# Patient Record
Sex: MALE | Race: WHITE | Employment: OTHER | URBAN - METROPOLITAN AREA
[De-identification: names, ages, dates, MRNs, and addresses within clinical notes are randomized per-mention and may not be internally consistent; named-entity substitution may affect disease eponyms.]

---

## 2020-04-14 ENCOUNTER — HOSPITAL ENCOUNTER (EMERGENCY)
Age: 75
Discharge: HOME | End: 2020-04-14
Attending: EMERGENCY MEDICINE
Payer: MEDICARE

## 2020-04-14 VITALS
TEMPERATURE: 97.7 F | HEART RATE: 53 BPM | SYSTOLIC BLOOD PRESSURE: 169 MMHG | DIASTOLIC BLOOD PRESSURE: 79 MMHG | RESPIRATION RATE: 18 BRPM | OXYGEN SATURATION: 99 %

## 2020-04-14 PROCEDURE — 99282 EMERGENCY DEPT VISIT SF MDM: CPT

## 2020-04-14 ASSESSMENT — ENCOUNTER SYMPTOMS
EYES NEGATIVE: 1
BLOOD IN STOOL: 0
COUGH: 0
SHORTNESS OF BREATH: 0
ABDOMINAL PAIN: 0

## 2020-04-14 NOTE — ED NOTES
Per MD, wet to dry dressing applied by this RN. Pt tolerated well, no signs of distress.  Pt educated on how to perform this dressing change at home and was given supplied to take home per MD request.     Lefty Baeza RN  04/14/20 5063

## 2020-04-14 NOTE — ED PROVIDER NOTES
frequency. Skin: Positive for wound (RLE). Neurological: Negative. Hematological: Negative. All other systems reviewed and are negative. Past Medical, Surgical, Family, and Social History     He has no past medical history on file. He has no past surgical history on file. His family history is not on file. He reports that he quit smoking about 27 years ago. He has never used smokeless tobacco. He reports previous alcohol use. He reports previous drug use. Medications     Previous Medications    No medications on file       Allergies     He has No Known Allergies. Physical Exam     INITIAL VITALS: BP: (!) 179/76, Temp: 97.7 °F (36.5 °C), Pulse: 53, Resp: 18, SpO2: 99 %   Physical Exam  Vitals signs reviewed. Constitutional:       General: He is not in acute distress. Appearance: Normal appearance. He is normal weight. He is not ill-appearing or toxic-appearing. Comments: Elderly though robust appearing white male sitting up in stretcher. Appears to be in no acute medical or respiratory distress. Pleasant and cooperative with exam.   HENT:      Head: Normocephalic and atraumatic. Right Ear: External ear normal.      Left Ear: External ear normal.      Nose: Nose normal.      Mouth/Throat:      Mouth: Mucous membranes are moist.   Eyes:      General: No scleral icterus. Right eye: No discharge. Left eye: No discharge. Conjunctiva/sclera: Conjunctivae normal.   Neck:      Musculoskeletal: No neck rigidity. Trachea: No tracheal deviation. Pulmonary:      Effort: Pulmonary effort is normal. No respiratory distress. Abdominal:      Tenderness: There is no abdominal tenderness. There is no guarding. Skin:     General: Skin is dry. Neurological:      Mental Status: He is alert.              Diagnostic Results     RECENT VITALS:  BP: (!) 179/76,Temp: 97.7 °F (36.5 °C), Pulse: 53, Resp: 18, SpO2: 99 %     Procedures     - n/a    ED Course     Nursing Wound of right lower extremity, initial encounter    2. Elevated blood pressure reading        Disposition     PATIENT REFERRED TO:  [facility]  [address]  [phone]  On 4/20/2020  Your appointment is at 9:45.  Go to [email or web address] (the 2990 entrance is closed)    Ophelia Phillips MD  [address] [address]  [phone]    Schedule an appointment as soon as possible for a visit   For follow up appointment to reset up a primary care doctor      DISCHARGE MEDICATIONS:  New Prescriptions    No medications on file       Alisha House MD  04/14/20 [address] [place] [address]

## 2020-04-20 ENCOUNTER — HOSPITAL ENCOUNTER (OUTPATIENT)
Dept: WOUND CARE | Age: 75
Discharge: HOME | End: 2020-04-20
Payer: MEDICARE

## 2020-04-20 VITALS
HEART RATE: 69 BPM | DIASTOLIC BLOOD PRESSURE: 79 MMHG | RESPIRATION RATE: 18 BRPM | SYSTOLIC BLOOD PRESSURE: 176 MMHG | TEMPERATURE: 97.6 F

## 2020-04-20 PROBLEM — S81.801A LEG WOUND, RIGHT, INITIAL ENCOUNTER: Status: ACTIVE | Noted: 2020-04-20

## 2020-04-20 PROCEDURE — 99202 OFFICE O/P NEW SF 15 MIN: CPT | Performed by: NURSE PRACTITIONER

## 2020-04-20 PROCEDURE — 11042 DBRDMT SUBQ TIS 1ST 20SQCM/<: CPT | Performed by: NURSE PRACTITIONER

## 2020-04-20 PROCEDURE — 99213 OFFICE O/P EST LOW 20 MIN: CPT

## 2020-04-20 PROCEDURE — 11042 DBRDMT SUBQ TIS 1ST 20SQCM/<: CPT

## 2020-04-20 RX ORDER — LIDOCAINE HYDROCHLORIDE 40 MG/ML
SOLUTION TOPICAL ONCE
Status: DISCONTINUED | OUTPATIENT
Start: 2020-04-20 | End: 2020-04-21 | Stop reason: HOSPADM

## 2020-04-20 NOTE — PROGRESS NOTES
ALLERGIES    No Known Allergies    MEDICATIONS    No current outpatient medications on file prior to encounter. No current facility-administered medications on file prior to encounter. REVIEW OF SYSTEMS    Pertinent items are noted in HPI. Objective:      BP (!) 176/79   Pulse 69   Temp 97.6 °F (36.4 °C) (Temporal)   Resp 18     PHYSICAL EXAM    General Appearance: alert and oriented to person, place and time, well-developed and well-nourished, in no acute distress and pale  Skin: warm and dry  Head: normocephalic and atraumatic  Eyes: pupils equal, round, and reactive to light  Pulmonary/Chest: clear to auscultation bilaterally- no wheezes, rales or rhonchi, normal air movement, no respiratory distress  Cardiovascular: normal rate, normal S1 and S2, no gallops, intact distal pulses and no carotid bruits  Extremities: no cyanosis, no clubbing and no edema      Assessment:     Patient Active Problem List   Diagnosis    CAD (coronary artery disease)    Leg wound, right, initial encounter       Procedure Note    Performed by: ALPESH Riley - CNP    Consent obtained: Yes    Time out taken:  Yes    Pain Control: Anesthetic  Anesthetic: 4% Lidocaine Cream     Debridement:Excisional Debridement    Using curette the wound was sharply debrided    down through and including the removal of epidermis, dermis and subcutaneous tissue.         Devitalized Tissue Debrided:  fibrin, biofilm and slough    Pre Debridement Measurements:  Are located in the Wound Documentation Flow Sheet    Wound #: 1     Post  Debridement Measurements:  Wound 04/20/20 Leg Medial;Right #1 (Active)   Wound Image   4/20/2020  9:43 AM   Wound Traumatic 4/20/2020  9:43 AM   Wound Cleansed Rinsed/Irrigated with saline 4/20/2020  9:43 AM   Wound Length (cm) 4.9 cm 4/20/2020  9:43 AM   Wound Width (cm) 2.2 cm 4/20/2020  9:43 AM   Wound Depth (cm) 0.1 cm 4/20/2020  9:43 AM   Wound Surface Area (cm^2) 10.78 cm^2 4/20/2020  9:43 AM Wound Volume (cm^3) 1.08 cm^3 4/20/2020  9:43 AM   Post-Procedure Length (cm) 5 cm 4/20/2020 10:12 AM   Post-Procedure Width (cm) 2.3 cm 4/20/2020 10:12 AM   Post-Procedure Depth (cm) 0.1 cm 4/20/2020 10:12 AM   Post-Procedure Surface Area (cm^2) 11.5 cm^2 4/20/2020 10:12 AM   Post-Procedure Volume (cm^3) 1.15 cm^3 4/20/2020 10:12 AM   Wound Assessment Bleeding 4/20/2020 10:12 AM   Drainage Amount Moderate 4/20/2020 10:12 AM   Drainage Description Serosanguinous 4/20/2020 10:12 AM   Anaya-wound Assessment Dry;Clean 4/20/2020  9:43 AM   [place]%Wound Bed 0 4/20/2020  9:43 AM   Red%Wound Bed 0 4/20/2020  9:43 AM   Yellow%Wound Bed 90 4/20/2020  9:43 AM   Black%Wound Bed 10 4/20/2020  9:43 AM   Purple%Wound Bed 0 4/20/2020  9:43 AM   Other%Wound Bed 0 4/20/2020  9:43 AM   Number of days: 0           Total Surface Area Debrided:  11.5 sq cm     Percentage of wound debrided 100%    Bleeding:  Minimal    Hemostasis Achieved:  not needed    Procedural Pain:  1  / 10     Post Procedural Pain:  0 / 10     Response to treatment:  Well tolerated by patient. Plan:     The nature of the patient's condition was explained in depth. The patient was informed that their compliance to the treatment plan is paramount to successful healing and prevention of further ulceration and/or infection     Discharge Treatment   Dressing care: Santyl, moist to dry dressing, 1 tubi  - Change daily. You have been given a prescription for Santyl. Santyl is an enzyme that dissolves dead tissue in the wound. Apply a small amount to the wound and cover with a slightly moist gauze. If you can't get Santyl call the wound center and we will order [address].       Written Patient Discharge Instructions Given            Electronically signed by ALPESH Glez CNP on 4/20/2020 at 2:05 PM

## 2020-04-27 ENCOUNTER — HOSPITAL ENCOUNTER (OUTPATIENT)
Dept: WOUND CARE | Age: 75
Discharge: HOME | End: 2020-04-27
Payer: MEDICARE

## 2020-04-27 VITALS
DIASTOLIC BLOOD PRESSURE: 67 MMHG | SYSTOLIC BLOOD PRESSURE: 159 MMHG | TEMPERATURE: 97.2 F | HEART RATE: 65 BPM | RESPIRATION RATE: 16 BRPM

## 2020-04-27 PROBLEM — S81.802D LEG WOUND, LEFT, SUBSEQUENT ENCOUNTER: Status: ACTIVE | Noted: 2020-04-27

## 2020-04-27 PROCEDURE — 11042 DBRDMT SUBQ TIS 1ST 20SQCM/<: CPT | Performed by: NURSE PRACTITIONER

## 2020-04-27 PROCEDURE — 11042 DBRDMT SUBQ TIS 1ST 20SQCM/<: CPT

## 2020-04-27 RX ORDER — LIDOCAINE HYDROCHLORIDE 40 MG/ML
SOLUTION TOPICAL ONCE
Status: DISCONTINUED | OUTPATIENT
Start: 2020-04-27 | End: 2020-04-28 | Stop reason: HOSPADM

## 2020-04-27 NOTE — PROGRESS NOTES
Discharge Treatment   Dressing care: Santyl, moist to dry dressing, 1 tubi  - Change daily. You have been given a prescription for Santyl. Santyl is an enzyme that dissolves dead tissue in the wound. Apply a small amount to the wound and cover with a slightly moist gauze.       Written Patient Discharge Instructions Given            Electronically signed by ALPESH Ibarra CNP on 4/27/2020 at 2:00 PM

## 2020-05-11 ENCOUNTER — HOSPITAL ENCOUNTER (OUTPATIENT)
Dept: WOUND CARE | Age: 75
Discharge: HOME | End: 2020-05-11
Payer: MEDICARE

## 2020-05-11 VITALS — RESPIRATION RATE: 16 BRPM | SYSTOLIC BLOOD PRESSURE: 155 MMHG | DIASTOLIC BLOOD PRESSURE: 76 MMHG | HEART RATE: 68 BPM

## 2020-05-11 PROCEDURE — 11042 DBRDMT SUBQ TIS 1ST 20SQCM/<: CPT

## 2020-05-11 PROCEDURE — 11042 DBRDMT SUBQ TIS 1ST 20SQCM/<: CPT | Performed by: NURSE PRACTITIONER

## 2020-05-11 NOTE — PROGRESS NOTES
medications on file prior to encounter. REVIEW OF SYSTEMS    Pertinent items are noted in HPI. Objective:      BP (!) 155/76   Pulse 68   Resp 16     PHYSICAL EXAM    General Appearance: alert and oriented to person, place and time, well-developed and well-nourished, in no acute distress  Skin: warm and dry, no rash or erythema  Head: normocephalic and atraumatic  Eyes: pupils equal, round, and reactive to light  Pulmonary/Chest:  normal air movement, no respiratory distress  Cardiovascular: normal rate, regular rhythm and intact distal pulses  Extremities: no cyanosis, no clubbing, no edema and venous stasis dermatosis noted      Assessment:     Patient Active Problem List   Diagnosis    CAD (coronary artery disease)    Leg wound, right, initial encounter    Leg wound, left, subsequent encounter       Procedure Note    Performed by: ALPESH Cervantes CNP    Consent obtained: Yes    Time out taken:  Yes    Pain Control: Anesthetic  Anesthetic: 4% Lidocaine Cream     Debridement:Excisional Debridement    Using curette the wound was sharply debrided    down through and including the removal of epidermis, dermis and subcutaneous tissue.         Devitalized Tissue Debrided:  fibrin, biofilm and slough    Pre Debridement Measurements:  Are located in the Wound Documentation Flow Sheet    Wound #: 1     Post  Debridement Measurements:  Wound 04/20/20 Leg Medial;Right #1 (Active)   Wound Image   4/20/2020  9:43 AM   Wound Traumatic 5/11/2020 10:09 AM   Wound Cleansed Rinsed/Irrigated with saline 5/11/2020 10:09 AM   Wound Length (cm) 3.6 cm 5/11/2020 10:09 AM   Wound Width (cm) 1.6 cm 5/11/2020 10:09 AM   Wound Depth (cm) 0.1 cm 5/11/2020 10:09 AM   Wound Surface Area (cm^2) 5.76 cm^2 5/11/2020 10:09 AM   Change in Wound Size % (l*w) 46.57 5/11/2020 10:09 AM   Wound Volume (cm^3) 0.58 cm^3 5/11/2020 10:09 AM   Wound Healing % 46 5/11/2020 10:09 AM   Post-Procedure Length (cm) 3.7 cm 5/11/2020 10:16 AM Post-Procedure Width (cm) 1.7 cm 5/11/2020 10:16 AM   Post-Procedure Depth (cm) 0.1 cm 5/11/2020 10:16 AM   Post-Procedure Surface Area (cm^2) 6.29 cm^2 5/11/2020 10:16 AM   Post-Procedure Volume (cm^3) 0.63 cm^3 5/11/2020 10:16 AM   Wound Assessment Bleeding 5/11/2020 10:16 AM   Drainage Amount Moderate 5/11/2020 10:16 AM   Drainage Description Serosanguinous 5/11/2020 10:16 AM   Odor None 5/11/2020 10:09 AM   Anaya-wound Assessment Dry;Clean 5/11/2020 10:09 AM   [place]%Wound Bed 70 5/11/2020 10:09 AM   Red%Wound Bed 0 5/11/2020 10:09 AM   Yellow%Wound Bed 30 5/11/2020 10:09 AM   Black%Wound Bed 0 5/11/2020 10:09 AM   Purple%Wound Bed 0 5/11/2020 10:09 AM   Other%Wound Bed 0 5/11/2020 10:09 AM   Number of days: 21           Total Surface Area Debrided:  6.29 sq cm     Percentage of wound debrided 100%    Bleeding:  Minimal    Hemostasis Achieved:  not needed    Procedural Pain:  2  / 10     Post Procedural Pain:  1 / 10     Response to treatment:  Well tolerated by patient. Plan:     The nature of the patient's condition was explained in depth. The patient was informed that their compliance to the treatment plan is paramount to successful healing and prevention of further ulceration and/or infection     Discharge Treatment   Dressing care: Santyl, moist to dry dressing, 1 tubi  - Change daily. You have been given a prescription for Santyl. Santyl is an enzyme that dissolves dead tissue in the wound. Apply a small amount to the wound and cover with a slightly moist gauze.       Written Patient Discharge Instructions Given            Electronically signed by ALPESH Steele CNP on 5/11/2020 at 10:44 AM

## 2020-05-28 ENCOUNTER — HOSPITAL ENCOUNTER (OUTPATIENT)
Dept: WOUND CARE | Age: 75
Discharge: HOME | End: 2020-05-28
Payer: MEDICARE

## 2020-05-28 VITALS — RESPIRATION RATE: 16 BRPM | SYSTOLIC BLOOD PRESSURE: 172 MMHG | HEART RATE: 64 BPM | DIASTOLIC BLOOD PRESSURE: 76 MMHG

## 2020-05-28 PROCEDURE — 11042 DBRDMT SUBQ TIS 1ST 20SQCM/<: CPT

## 2020-05-28 PROCEDURE — 11042 DBRDMT SUBQ TIS 1ST 20SQCM/<: CPT | Performed by: NURSE PRACTITIONER

## 2020-06-11 ENCOUNTER — HOSPITAL ENCOUNTER (OUTPATIENT)
Dept: WOUND CARE | Age: 75
Discharge: HOME | End: 2020-06-11
Payer: MEDICARE

## 2020-06-11 VITALS
SYSTOLIC BLOOD PRESSURE: 151 MMHG | DIASTOLIC BLOOD PRESSURE: 77 MMHG | TEMPERATURE: 97.8 F | RESPIRATION RATE: 16 BRPM | HEART RATE: 69 BPM

## 2020-06-11 PROBLEM — S81.801D LEG WOUND, RIGHT, SUBSEQUENT ENCOUNTER: Status: ACTIVE | Noted: 2020-04-27

## 2020-06-11 PROCEDURE — 11042 DBRDMT SUBQ TIS 1ST 20SQCM/<: CPT | Performed by: NURSE PRACTITIONER

## 2020-06-11 PROCEDURE — 11042 DBRDMT SUBQ TIS 1ST 20SQCM/<: CPT

## 2020-06-11 RX ORDER — LIDOCAINE HYDROCHLORIDE 40 MG/ML
SOLUTION TOPICAL ONCE
Status: DISCONTINUED | OUTPATIENT
Start: 2020-06-11 | End: 2020-06-12 | Stop reason: HOSPADM

## 2020-06-11 NOTE — PROGRESS NOTES
1:37 PM   Post-Procedure Depth (cm) 0.1 cm 6/11/2020  1:37 PM   Post-Procedure Surface Area (cm^2) 1.44 cm^2 6/11/2020  1:37 PM   Post-Procedure Volume (cm^3) 0.14 cm^3 6/11/2020  1:37 PM   Wound Assessment Bleeding 6/11/2020  1:37 PM   Drainage Amount Moderate 6/11/2020  1:37 PM   Drainage Description Serosanguinous 6/11/2020  1:37 PM   Odor None 6/11/2020  1:25 PM   Anaya-wound Assessment [place] 6/11/2020  1:25 PM   [place]%Wound Bed 100 6/11/2020  1:25 PM   Red%Wound Bed 0 6/11/2020  1:25 PM   Yellow%Wound Bed 0 6/11/2020  1:25 PM   Black%Wound Bed 0 6/11/2020  1:25 PM   Purple%Wound Bed 0 6/11/2020  1:25 PM   Other%Wound Bed 0 6/11/2020  1:25 PM   Number of days: 52           Total Surface Area Debrided:  1.44 sq cm     Percentage of wound debrided 100%    Bleeding:  Minimal    Hemostasis Achieved:  not needed    Procedural Pain:  0  / 10     Post Procedural Pain:  0 / 10     Response to treatment:  Well tolerated by patient. Plan:     The nature of the patient's condition was explained in depth. The patient was informed that their compliance to the treatment plan is paramount to successful healing and prevention of further ulceration and/or infection     Discharge Treatment  Pt education per provider related to progress I wound healing and continuation of same plan of care. Pt agreeable with plan of care with hydrofera blue every changed every other day and questions answered.      Written Patient Discharge Instructions Given            Electronically signed by ALPESH Alvarez CNP on 6/11/2020 at 1:44 PM

## 2020-06-25 ENCOUNTER — HOSPITAL ENCOUNTER (OUTPATIENT)
Dept: WOUND CARE | Age: 75
Discharge: HOME | End: 2020-06-25
Payer: MEDICARE

## 2020-06-25 VITALS — RESPIRATION RATE: 16 BRPM | HEART RATE: 64 BPM | DIASTOLIC BLOOD PRESSURE: 71 MMHG | SYSTOLIC BLOOD PRESSURE: 153 MMHG

## 2020-06-25 PROCEDURE — 99212 OFFICE O/P EST SF 10 MIN: CPT

## 2020-06-25 PROCEDURE — 99212 OFFICE O/P EST SF 10 MIN: CPT | Performed by: NURSE PRACTITIONER

## 2020-06-25 RX ORDER — CLOBETASOL PROPIONATE 0.5 MG/G
OINTMENT TOPICAL ONCE
Status: CANCELLED | OUTPATIENT
Start: 2020-06-25

## 2020-06-25 RX ORDER — LIDOCAINE 50 MG/G
OINTMENT TOPICAL ONCE
Status: CANCELLED | OUTPATIENT
Start: 2020-06-25

## 2020-06-25 RX ORDER — GINSENG 100 MG
CAPSULE ORAL ONCE
Status: CANCELLED | OUTPATIENT
Start: 2020-06-25

## 2020-06-25 RX ORDER — BETAMETHASONE DIPROPIONATE 0.05 %
OINTMENT (GRAM) TOPICAL ONCE
Status: CANCELLED | OUTPATIENT
Start: 2020-06-25

## 2020-06-25 RX ORDER — LIDOCAINE 40 MG/G
CREAM TOPICAL ONCE
Status: CANCELLED | OUTPATIENT
Start: 2020-06-25

## 2020-06-25 RX ORDER — BACITRACIN ZINC AND POLYMYXIN B SULFATE 500; 1000 [USP'U]/G; [USP'U]/G
OINTMENT TOPICAL ONCE
Status: CANCELLED | OUTPATIENT
Start: 2020-06-25

## 2020-06-25 RX ORDER — LIDOCAINE HYDROCHLORIDE 40 MG/ML
SOLUTION TOPICAL ONCE
Status: CANCELLED | OUTPATIENT
Start: 2020-06-25

## 2020-06-25 RX ORDER — GENTAMICIN SULFATE 1 MG/G
OINTMENT TOPICAL ONCE
Status: CANCELLED | OUTPATIENT
Start: 2020-06-25

## 2020-06-25 RX ORDER — LIDOCAINE HYDROCHLORIDE 40 MG/ML
SOLUTION TOPICAL ONCE
Status: DISCONTINUED | OUTPATIENT
Start: 2020-06-25 | End: 2020-06-25

## 2020-06-25 RX ORDER — BACITRACIN, NEOMYCIN, POLYMYXIN B 400; 3.5; 5 [USP'U]/G; MG/G; [USP'U]/G
OINTMENT TOPICAL ONCE
Status: CANCELLED | OUTPATIENT
Start: 2020-06-25

## 2020-06-25 NOTE — PROGRESS NOTES
oriented to person, place and time, well-developed and well-nourished, in no acute distress  Skin: warm and dry, no rash or erythema  Head: normocephalic and atraumatic  Eyes: pupils equal, round, and reactive to light  Pulmonary/Chest:  normal air movement, no respiratory distress  Cardiovascular: normal rate, regular rhythm and intact distal pulses  Extremities: no cyanosis, no clubbing and venous stasis dermatosis noted      Assessment:     Patient Active Problem List   Diagnosis    CAD (coronary artery disease)    Leg wound, right, initial encounter    Leg wound, right, subsequent encounter       Procedure Note - No procedure    Wound 04/20/20 Leg Right; Lower;Medial #1 (Active)   Wound Image   4/20/2020  9:43 AM   Wound Traumatic 6/25/2020  1:07 PM   Wound Cleansed Rinsed/Irrigated with saline 6/25/2020  1:07 PM   Wound Length (cm) 0 cm 6/25/2020  1:07 PM   Wound Width (cm) 0 cm 6/25/2020  1:07 PM   Wound Depth (cm) 0 cm 6/25/2020  1:07 PM   Wound Surface Area (cm^2) 0 cm^2 6/25/2020  1:07 PM   Change in Wound Size % (l*w) 100 6/25/2020  1:07 PM   Wound Volume (cm^3) 0 cm^3 6/25/2020  1:07 PM   Wound Healing % 100 6/25/2020  1:07 PM   Post-Procedure Length (cm) 1.6 cm 6/11/2020  1:37 PM   Post-Procedure Width (cm) 0.9 cm 6/11/2020  1:37 PM   Post-Procedure Depth (cm) 0.1 cm 6/11/2020  1:37 PM   Post-Procedure Surface Area (cm^2) 1.44 cm^2 6/11/2020  1:37 PM   Post-Procedure Volume (cm^3) 0.14 cm^3 6/11/2020  1:37 PM   Wound Assessment Dry 6/25/2020  1:07 PM   Drainage Amount None 6/25/2020  1:07 PM   Drainage Description Serosanguinous 6/11/2020  1:37 PM   Odor None 6/11/2020  1:25 PM   Anaya-wound Assessment Dry;Clean 6/25/2020  1:07 PM   [place]%Wound Bed 100 6/11/2020  1:25 PM   Red%Wound Bed 0 6/11/2020  1:25 PM   Yellow%Wound Bed 0 6/11/2020  1:25 PM   Black%Wound Bed 0 6/11/2020  1:25 PM   Purple%Wound Bed 0 6/11/2020  1:25 PM   Other%Wound Bed 0 6/11/2020  1:25 PM   Number of days: 66         Plan:      The nature of the patient's condition was explained in depth. The patient was informed that their compliance to the treatment plan is paramount to successful healing and prevention of further ulceration and/or infection     Discharge Treatment   Cover closed wound with silicone dressing and keep on until Monday. Discussed keeping lotion to legs to add moisture. Pt agreeable to plan of care and questions answered.    Written Patient Discharge Instructions Given            Electronically signed by ALPESH Quan CNP on 6/25/2020 at 1:22 PM

## 2021-07-21 ENCOUNTER — APPOINTMENT (OUTPATIENT)
Dept: GENERAL RADIOLOGY | Age: 76
End: 2021-07-21
Payer: MEDICARE

## 2021-07-21 ENCOUNTER — HOSPITAL ENCOUNTER (EMERGENCY)
Age: 76
Discharge: HOME | End: 2021-07-22
Attending: EMERGENCY MEDICINE
Payer: MEDICARE

## 2021-07-21 VITALS
DIASTOLIC BLOOD PRESSURE: 72 MMHG | RESPIRATION RATE: 18 BRPM | HEART RATE: 78 BPM | TEMPERATURE: 98.2 F | OXYGEN SATURATION: 100 % | SYSTOLIC BLOOD PRESSURE: 167 MMHG
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
## 2021-07-21 PROCEDURE — 6360000002 HC RX W HCPCS: Performed by: NURSE PRACTITIONER

## 2021-07-21 PROCEDURE — 90715 TDAP VACCINE 7 YRS/> IM: CPT | Performed by: NURSE PRACTITIONER

## 2021-07-21 PROCEDURE — 90471 IMMUNIZATION ADMIN: CPT | Performed by: NURSE PRACTITIONER

## 2021-07-21 PROCEDURE — 2500000003 HC RX 250 WO HCPCS: Performed by: NURSE PRACTITIONER

## 2021-07-21 PROCEDURE — 73590 X-RAY EXAM OF LOWER LEG: CPT

## 2021-07-21 PROCEDURE — 2580000003 HC RX 258: Performed by: NURSE PRACTITIONER

## 2021-07-21 PROCEDURE — 96366 THER/PROPH/DIAG IV INF ADDON: CPT

## 2021-07-21 PROCEDURE — 12006 RPR S/N/A/GEN/TRK20.1-30.0CM: CPT

## 2021-07-21 PROCEDURE — 99283 EMERGENCY DEPT VISIT LOW MDM: CPT

## 2021-07-21 PROCEDURE — 96365 THER/PROPH/DIAG IV INF INIT: CPT

## 2021-07-21 RX ADMIN — TETANUS TOXOID, REDUCED DIPHTHERIA TOXOID AND ACELLULAR PERTUSSIS VACCINE, ADSORBED 0.5 ML: 5; 2.5; 8; 8; 2.5 SUSPENSION INTRAMUSCULAR at 22:48

## 2021-07-21 RX ADMIN — LIDOCAINE HYDROCHLORIDE 10 ML: 10; .005 INJECTION, SOLUTION EPIDURAL; INFILTRATION; INTRACAUDAL; PERINEURAL at 22:56

## 2021-07-21 RX ADMIN — SODIUM CHLORIDE 1500 MG: 900 INJECTION INTRAVENOUS at 22:55

## 2021-07-21 ASSESSMENT — PAIN SCALES - GENERAL: PAINLEVEL_OUTOF10: 0

## 2021-07-22 PROCEDURE — 96366 THER/PROPH/DIAG IV INF ADDON: CPT

## 2021-07-22 PROCEDURE — 6370000000 HC RX 637 (ALT 250 FOR IP): Performed by: NURSE PRACTITIONER

## 2021-07-22 RX ORDER — BACITRACIN ZINC AND POLYMYXIN B SULFATE 500; 1000 [USP'U]/G; [USP'U]/G
OINTMENT TOPICAL ONCE
Status: COMPLETED | OUTPATIENT
Start: 2021-07-22 | End: 2021-07-22

## 2021-07-22 RX ORDER — AMOXICILLIN AND CLAVULANATE POTASSIUM 875; 125 MG/1; MG/1
1 TABLET, FILM COATED ORAL 2 TIMES DAILY
Qty: 14 TABLET | Refills: 0 | Status: SHIPPED | OUTPATIENT
Start: 2021-07-22 | End: 2021-07-29

## 2021-07-22 RX ORDER — HYDROCODONE BITARTRATE AND ACETAMINOPHEN 5; 325 MG/1; MG/1
1 TABLET ORAL EVERY 6 HOURS PRN
Qty: 8 TABLET | Refills: 0 | Status: SHIPPED | OUTPATIENT
Start: 2021-07-22 | End: 2021-07-25

## 2021-07-22 RX ADMIN — BACITRACIN ZINC AND POLYMYXIN B SULFATE: 500; 10000 OINTMENT TOPICAL at 00:59

## 2021-07-22 NOTE — ED NOTES
Discharge instructions and prescriptions reviewed with patient. Pt verbalized understanding. IV d/c. Pt tolerated well. Pt discharged home by self.        Jaron Guevara RN  07/22/21 4501

## 2021-07-22 NOTE — ED PROVIDER NOTES
1 [facility]  EMERGENCY DEPARTMENT ENCOUNTER          NURSE PRACTITIONER NOTE     Date of evaluation: 7/21/2021    Chief Complaint     Animal Bite      History of Present Illness     Nubia Matos is a 77 y.o. male with no significant past medical history presents to the emergency department with a dog bite to the right lower leg. Patient states that this dog has chased him multiple times in the past.  Today he was riding his bike by the dog was loose and came up and bit him on his right lower extremity. Significant soft tissue defect. States he went home and came straight here. Is unclear of the date of his last tetanus immunization. He knows the house where the dog lives, but does not know the vaccination status. The police are not yet involved in this case. He denies any numbness or weakness to the foot. He denies additional injuries associated with the incident. With the exception of the above, there are no aggravating or alleviating factors. Review of Systems     Pertinent positive and negative findings as documented above in HPI, otherwise all other systems were reviewed and negative     Past Medical, Surgical, Family, and Social History     Medical History:   Past Medical History:   Diagnosis Date    Leg wound, left, subsequent encounter 4/27/2020    traumatic    Leg wound, right, subsequent encounter 4/27/2020    traumatic       Surgical History: History reviewed. No pertinent surgical history. Social History: He reports that he quit smoking about 28 years ago. He has never used smokeless tobacco. He reports previous alcohol use. He reports previous drug use. Family History: His family history is not on file. Medications     Previous Medications    No medications on file       Allergies     Allergies:    Allergies as of 07/21/2021    (No Known Allergies)        Physical Exam     INITIAL VITALS: BP: (!) 167/72, Temp: 98.2 °F (36.8 °C), Pulse: 78, Resp: 18, SpO2: 100 % edges approximated. The patient tolerated the procedure well. Complications: None          ED Course     Nursing Notes, Past Medical Hx, Past Surgical Hx, Social Hx, Allergies, and Family Hx were reviewed. The patient was given the following medications:  Orders Placed This Encounter   Medications    ampicillin-sulbactam (UNASYN) 1500 mg IVPB minibag     Order Specific Question:   Antimicrobial Indications     Answer: Other     Order Specific Question:   Antimicrobial Indications     Answer:   Skin and Soft Tissue Infection     Order Specific Question:   Other Abx Indication     Answer:   dog bite    Tetanus-Diphth-Acell Pertussis (BOOSTRIX) injection 0.5 mL    lidocaine-EPINEPHrine 1 percent-1:[id number] injection 10 mL    amoxicillin-clavulanate (AUGMENTIN) 875-125 MG per tablet     Sig: Take 1 tablet by mouth 2 times daily for 7 days     Dispense:  14 tablet     Refill:  0    HYDROcodone-acetaminophen (NORCO) 5-325 MG per tablet     Sig: Take 1 tablet by mouth every 6 hours as needed for Pain for up to 3 days. Dispense:  8 tablet     Refill:  0            CONSULTS:  None    MEDICAL DECISION MAKING / ASSESSMENT / PLAN     Briefly, Paige Betancourt is a 77 y.o. male who presented to the emergency department with dog bite to his right lower extremity. On presentation, is well-appearing, no acute distress. Is afebrile with stable vital signs. On exam, significant soft tissue defect to the lateral aspect of the right lower leg. An x-ray was obtained which showed no evidence of foreign body or underlying osseous abnormality. He was written for Unasyn and his tetanus was updated. The area was anesthetized, irrigated copiously, and loosely closed as indicated above in the procedure note. He will be started on Augmentin for antibiotic prophylaxis. He was instructed to follow-up with lawn [employer] cement/animal control to verify the rabies status of the dog.   He states he plans to go to the police department after he is discharged. He is aware that if the animal is not vaccinated and cannot be quarantined for 14 days that he will need to return to the emergency department for rabies immunoglobulin/immunization. He was instructed to monitor for signs symptoms of local wound infection. He will be given instructions to follow-up with the [facility] outpatient clinic for suture removal in approximately 10 days. At this point in time, patient is stable for discharge. Patient was given strict return precautions as outlined in the AVS. Patient was agreeable and understanding to this plan of care. Prior to discharge, patient was ambulatory and PO tolerant. The patient was evaluated by myself and the ED Attending Physician, Dr. Dean Lacks All management and disposition plans were discussed and agreed upon. Clinical Impression     1. Dog bite, initial encounter        Disposition     DC    DISCHARGE MEDICATIONS:  New Prescriptions    AMOXICILLIN-CLAVULANATE (AUGMENTIN) 875-125 MG PER TABLET    Take 1 tablet by mouth 2 times daily for 7 days    HYDROCODONE-ACETAMINOPHEN (NORCO) 5-325 MG PER TABLET    Take 1 tablet by mouth every 6 hours as needed for Pain for up to 3 days.        PATIENT REFERRED TO:  [facility] Emergency Department  [address] [address] [address]  [address]  [phone]    As needed, If symptoms worsen    Sherman Son MD  [address]          [address]  [facility] [address] [address] ALPESH Miramontes - CNP, CNP  Department of Emergency Medicine     [address] [facility] [address] [place] [address]  07/22/21 0021

## 2021-07-22 NOTE — ED PROVIDER NOTES
ED Attending Attestation Note     Date of evaluation: 7/21/2021    This patient was seen by the advance practice provider. I have seen and examined the patient, agree with the workup, evaluation, management and diagnosis. The care plan has been discussed. My assessment reveals complaints of a dog bite to the right leg. Patient was bitten by a neighbor's dog. Patient has a large skin defect present to the lateral calf and is neurovascularly intact distally. I was present for the laceration repair.       Lazarus Snuffer, MD  07/21/21 5472

## 2021-08-01 ENCOUNTER — HOSPITAL ENCOUNTER (EMERGENCY)
Age: 76
Discharge: HOME | End: 2021-08-01
Attending: EMERGENCY MEDICINE
Payer: MEDICARE

## 2021-08-01 VITALS
SYSTOLIC BLOOD PRESSURE: 167 MMHG | WEIGHT: 130 LBS | OXYGEN SATURATION: 100 % | BODY MASS INDEX: 19.26 KG/M2 | TEMPERATURE: 99 F | HEART RATE: 83 BPM | DIASTOLIC BLOOD PRESSURE: 83 MMHG | RESPIRATION RATE: 16 BRPM | HEIGHT: 69 IN

## 2021-08-01 DIAGNOSIS — S81.801D LEG WOUND, RIGHT, SUBSEQUENT ENCOUNTER: Primary | ICD-10-CM

## 2021-08-01 PROCEDURE — 99283 EMERGENCY DEPT VISIT LOW MDM: CPT

## 2021-08-01 PROCEDURE — 6370000000 HC RX 637 (ALT 250 FOR IP): Performed by: STUDENT IN AN ORGANIZED HEALTH CARE EDUCATION/TRAINING PROGRAM

## 2021-08-01 RX ORDER — BACITRACIN ZINC AND POLYMYXIN B SULFATE 500; 1000 [USP'U]/G; [USP'U]/G
OINTMENT TOPICAL ONCE
Status: COMPLETED | OUTPATIENT
Start: 2021-08-01 | End: 2021-08-01

## 2021-08-01 RX ADMIN — BACITRACIN ZINC AND POLYMYXIN B SULFATE: 500; 10000 OINTMENT TOPICAL at 22:21

## 2021-08-02 NOTE — ED PROVIDER NOTES
ED Attending Attestation Note     Date of evaluation: 8/1/2021    This patient was seen by the resident. I have seen and examined the patient, agree with the workup, evaluation, management and diagnosis. The care plan has been discussed. My assessment reveals a slender, elderly gentleman, in no acute distress. He presents, as instructed, 10 days after suture placement for a severe dog bite to the lateral aspect of the right lower leg. On examination of the wound, there appears to have been a great deal of tension on the wound, as there are multiple mattress sutures in place. There does seem to still be a good bit of tension on the tissues, and there are some areas where the wound edges are and not yet healing. Given his frail skin, the tension on the wound, and incomplete healing, it is felt to be prudent to allow the sutures to remain in place for closer to 14 days. He is given a referral to the wound care clinic, and hopefully he can be seen in the next few days to have sutures removed there, and began following up on a routine basis, as he has had to do for other wounds.   There is no evidence of infection, however, on exam.       Brianda Parker MD  08/01/21 3303

## 2021-08-02 NOTE — ED PROVIDER NOTES
Cardiovascular:      Rate and Rhythm: Normal rate and regular rhythm. Pulses: Normal pulses. Pulmonary:      Effort: Pulmonary effort is normal.   Abdominal:      General: Abdomen is flat. Palpations: Abdomen is soft. Musculoskeletal:         General: Deformity present. No swelling or tenderness. Normal range of motion. Right lower leg: No edema. Left lower leg: No edema. Skin:     General: Skin is warm and dry. Neurological:      General: No focal deficit present. Mental Status: He is alert and oriented to person, place, and time. Psychiatric:         Mood and Affect: Mood normal.         Behavior: Behavior normal.         Thought Content: Thought content normal.         Judgment: Judgment normal.         Diagnostic Results       RADIOLOGY:  No orders to display       LABS:   No results found for this visit on 08/01/21. RECENT VITALS:  BP: (!) 167/83, Temp: 99 °F (37.2 °C),Pulse: 83, Resp: 16, SpO2: 100 %       ED Course     Nursing Notes, Past Medical Hx, Past Surgical Hx, Social Hx, Allergies, and FamilyHx were reviewed. The patient was giventhe following medications:  Orders Placed This Encounter   Medications    bacitracin-polymyxin b (POLYSPORIN) ointment       CONSULTS:  None    MEDICAL DECISION MAKING / ASSESSMENT / Katie Nohelia is a 77 y.o. male who presented to the ED for suture removal after sustaining a dog bite on July 21. On examination of his wound, there appears to be tension of the wound tissue and parts are not yet healed adequately. This patient would benefit from closer to 14 days prior to suture removal.  His wound was irrigated, cleaned, and dressed. He was referred to see wound care for suture removal and follow-up of his wounds. Additionally, I discussed the importance of verifying the rabies status of the dog who bit him. This was already discussed with him at length at his prior ED visit.   He insists on going to his police station this evening to follow-up with their investigation with the Department of Health regarding any progress made in determination of rabies status of this dog. I reiterated that this issue is time sensitive and the patient should return as soon as possible if he does not obtain any information or if the dog has not been quarantined at this point. This patient was also evaluated by the attending physician. All care plans were discussed and agreed upon. Clinical Impression     1.  Leg wound, right, subsequent encounter        Disposition     PATIENT REFERRED TO:  Nicole Morgan MD  [address] [place] [address]    In 1 week      [address] [place] [address]  [address] [place] [address] [address]  Schedule an appointment as soon as possible for a visit on 8/4/2021  For wound re-check, For suture removal      DISCHARGE MEDICATIONS:  New Prescriptions    No medications on file       DISPOSITION Decision To Discharge 08/01/2021 10:23:43 PM     Stacey Queen MD  Resident  08/01/21 [id number]

## 2021-08-05 ENCOUNTER — HOSPITAL ENCOUNTER (OUTPATIENT)
Dept: WOUND CARE | Age: 76
Discharge: HOME | End: 2021-08-05
Payer: MEDICARE

## 2021-08-05 VITALS
BODY MASS INDEX: 18.61 KG/M2 | HEART RATE: 69 BPM | WEIGHT: 125.66 LBS | RESPIRATION RATE: 16 BRPM | HEIGHT: 69 IN | DIASTOLIC BLOOD PRESSURE: 89 MMHG | TEMPERATURE: 97.5 F | SYSTOLIC BLOOD PRESSURE: 166 MMHG

## 2021-08-05 DIAGNOSIS — T81.89XA NON-HEALING SURGICAL WOUND, INITIAL ENCOUNTER: ICD-10-CM
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
## 2021-08-05 DIAGNOSIS — S81.801A LEG WOUND, RIGHT, INITIAL ENCOUNTER: ICD-10-CM

## 2021-08-05 DIAGNOSIS — S81.801D LEG WOUND, RIGHT, SUBSEQUENT ENCOUNTER: Primary | ICD-10-CM

## 2021-08-05 PROCEDURE — 97597 DBRDMT OPN WND 1ST 20 CM/<: CPT | Performed by: SPECIALIST

## 2021-08-05 PROCEDURE — 11042 DBRDMT SUBQ TIS 1ST 20SQCM/<: CPT

## 2021-08-05 PROCEDURE — 6370000000 HC RX 637 (ALT 250 FOR IP): Performed by: SPECIALIST

## 2021-08-05 PROCEDURE — 99213 OFFICE O/P EST LOW 20 MIN: CPT

## 2021-08-05 PROCEDURE — 99202 OFFICE O/P NEW SF 15 MIN: CPT | Performed by: SPECIALIST

## 2021-08-05 PROCEDURE — 29581 APPL MULTLAYER CMPRN SYS LEG: CPT

## 2021-08-05 RX ORDER — BETAMETHASONE DIPROPIONATE 0.05 %
OINTMENT (GRAM) TOPICAL ONCE
Status: CANCELLED | OUTPATIENT
Start: 2021-08-05 | End: 2021-08-05

## 2021-08-05 RX ORDER — LIDOCAINE HYDROCHLORIDE 20 MG/ML
JELLY TOPICAL ONCE
Status: CANCELLED | OUTPATIENT
Start: 2021-08-05 | End: 2021-08-05

## 2021-08-05 RX ORDER — LIDOCAINE 50 MG/G
OINTMENT TOPICAL ONCE
Status: CANCELLED | OUTPATIENT
Start: 2021-08-05 | End: 2021-08-05

## 2021-08-05 RX ORDER — GINSENG 100 MG
CAPSULE ORAL ONCE
Status: CANCELLED | OUTPATIENT
Start: 2021-08-05 | End: 2021-08-05

## 2021-08-05 RX ORDER — LIDOCAINE HYDROCHLORIDE 40 MG/ML
2.5 SOLUTION TOPICAL ONCE
Status: COMPLETED | OUTPATIENT
Start: 2021-08-05 | End: 2021-08-05

## 2021-08-05 RX ORDER — LIDOCAINE 40 MG/G
CREAM TOPICAL ONCE
Status: CANCELLED | OUTPATIENT
Start: 2021-08-05 | End: 2021-08-05

## 2021-08-05 RX ORDER — BACITRACIN, NEOMYCIN, POLYMYXIN B 400; 3.5; 5 [USP'U]/G; MG/G; [USP'U]/G
OINTMENT TOPICAL ONCE
Status: CANCELLED | OUTPATIENT
Start: 2021-08-05 | End: 2021-08-05

## 2021-08-05 RX ORDER — GENTAMICIN SULFATE 1 MG/G
OINTMENT TOPICAL ONCE
Status: CANCELLED | OUTPATIENT
Start: 2021-08-05 | End: 2021-08-05

## 2021-08-05 RX ORDER — CLOBETASOL PROPIONATE 0.5 MG/G
OINTMENT TOPICAL ONCE
Status: CANCELLED | OUTPATIENT
Start: 2021-08-05 | End: 2021-08-05

## 2021-08-05 RX ORDER — LIDOCAINE HYDROCHLORIDE 40 MG/ML
SOLUTION TOPICAL ONCE
Status: CANCELLED | OUTPATIENT
Start: 2021-08-05 | End: 2021-08-05

## 2021-08-05 RX ORDER — BACITRACIN ZINC AND POLYMYXIN B SULFATE 500; 1000 [USP'U]/G; [USP'U]/G
OINTMENT TOPICAL ONCE
Status: CANCELLED | OUTPATIENT
Start: 2021-08-05 | End: 2021-08-05

## 2021-08-05 RX ADMIN — LIDOCAINE HYDROCHLORIDE 2.5 ML: 40 SOLUTION TOPICAL at 09:57

## 2021-08-05 ASSESSMENT — PAIN SCALES - GENERAL: PAINLEVEL_OUTOF10: 0

## 2021-08-05 NOTE — PROGRESS NOTES
[facility]  Progress Note and Procedure Note      Nubia Matos  MEDICAL RECORD NUMBER:  [id number]  AGE: 77 y.o. GENDER: male  : 1945  EPISODE DATE:  2021      Subjective:     Chief Complaint   Patient presents with    Wound Check     initial         HISTORY of PRESENT ILLNESS HPI     Nubia Matos is a 77 y.o. male who presents today for wound evaluation. History of Wound Context: This patient was referred from the emergency room after having sustained a dog bite to the right lower extremity several weeks ago. He was initially seen in the emergency room the wound was primarily closed. Subsequently seen 10 days later for further evaluation. At that time it was decided he should be seen in wound care. It should be noted ER provider stated there was a great deal of tension on the wound with a nylon suture still in place. Wound Pain Timing/Severity: none  Quality of pain: N/A  Severity:  0 / 10   Modifying Factors: None  Associated Signs/Symptoms: none    Wound Identification:  Wound Type: non-healing surgical and traumatic  Contributing Factors: none    Acute Wound: N/A not an acute wound        PAST MEDICAL HISTORY        Diagnosis Date    CAD (coronary artery disease)     Leg wound, left, subsequent encounter 2020    traumatic    Leg wound, right, subsequent encounter 2020    traumatic    Osteoporosis        PAST SURGICAL HISTORY    Past Surgical History:   Procedure Laterality Date    INGUINAL HERNIA REPAIR Bilateral     JOINT REPLACEMENT      bilateral femurs    UMBILICAL HERNIA REPAIR         FAMILY HISTORY    History reviewed. No pertinent family history.     SOCIAL HISTORY    Social History     Tobacco Use    Smoking status: Former Smoker     Quit date: 1992     Years since quittin.6    Smokeless tobacco: Never Used   Vaping Use    Vaping Use: Never used   Substance Use Topics    Alcohol use: Not Currently    Drug use: Not Currently       ALLERGIES    No Known Allergies    MEDICATIONS    Current Outpatient Medications on File Prior to Encounter   Medication Sig Dispense Refill    calcium-vitamin D (OSCAL) 250-125 MG-UNIT per tablet Take 1 tablet by mouth daily      Multiple Vitamins-Minerals (CENTRUM SILVER ULTRA MENS PO) Take by mouth       No current facility-administered medications on file prior to encounter. REVIEW OF SYSTEMS    Constitutional: negative for chills and fevers  Respiratory: negative for shortness of breath  Cardiovascular: negative for chest pain  Gastrointestinal: negative for abdominal pain  Musculoskeletal:negative for muscle weakness and myalgias      Last M3A if applicable: No results found for: LABA1C    Objective:      BP (!) 166/89   Pulse 69   Temp 97.5 °F (36.4 °C) (Temporal)   Resp 16   Ht 5' 9\" (1.753 m)   Wt 125 lb 10.6 oz (57 kg)   BMI 18.56 kg/m²     Wt Readings from Last 3 Encounters:   08/05/21 125 lb 10.6 oz (57 kg)   08/01/21 130 lb (59 kg)   02/26/14 138 lb (62.6 kg)       PHYSICAL EXAM    General Appearance: alert and oriented to person, place and time, well-developed and well-nourished, in no acute distress  Head: normocephalic and atraumatic  Pulmonary/Chest: clear to auscultation bilaterally- no wheezes, rales or rhonchi, normal air movement, no respiratory distress  Cardiovascular: normal rate, normal S1 and S2, no gallops, intact distal pulses and no carotid bruits  Abdomen: soft, non-tender, non-distended, normal bowel sounds, no masses or organomegaly  Extremities: no cyanosis, clubbing or edema and multiple nylon sutures with primary closure recent dog bite with superficial necrosis of skin lateral portion right knee  Second small wound with superficial necrosis of skin where  primary closure was done                    Assessment:     1. Leg wound, right, subsequent encounter    2.  Leg wound, right, initial encounter          Procedure Note  Indications:  Based on my examination of this patient's wound(s) today, sharp excision is required to promote healing and evaluate the extent healing. Performed by: Steffen Martínez MD    Consent obtained: Yes    Time out taken:  Yes    Pain Control: Anesthetic  Anesthetic: 4% Lidocaine Liquid Topical       Debridement:Excisional Debridement    Using curette, scissors and forceps the wound(s) was/were sharply debrided down through and including the removal of epidermis and dermis. Devitalized Tissue Debrided:  necrotic/eschar    Pre Debridement Measurements:  Are located in the Wound Documentation Flow Sheet    Wound #: 1     Post  Debridement Measurements:  Wound 08/05/21 Leg Lateral;Right; Lower #1 (Active)   Wound Image   08/05/21 1015   Wound Etiology Traumatic 08/05/21 0912   Dressing Status New dressing applied;Clean;Dry; Intact 07/21/21 2230   Wound Cleansed Cleansed with saline 08/05/21 0912   Dressing/Treatment Collagen 08/05/21 1015   Wound Length (cm) 6 cm 08/05/21 0912   Wound Width (cm) 2.5 cm 08/05/21 0912   Wound Depth (cm) 0.1 cm 08/05/21 0912   Wound Surface Area (cm^2) 15 cm^2 08/05/21 0912   Wound Volume (cm^3) 1.5 cm^3 08/05/21 0912   Post-Procedure Length (cm) 6.1 cm 08/05/21 1015   Post-Procedure Width (cm) 2.6 cm 08/05/21 1015   Post-Procedure Depth (cm) 0.1 cm 08/05/21 1015   Post-Procedure Surface Area (cm^2) 15.86 cm^2 08/05/21 1015   Post-Procedure Volume (cm^3) 1.586 cm^3 08/05/21 1015   Tunneling Position ___ O'Clock 0 08/05/21 0912   Undermining Maxium Distance (cm) 0 08/05/21 0912   Wound Assessment Pink/red 08/05/21 0912   Drainage Amount Small 08/05/21 0912   Drainage Description Brown 08/05/21 0912   Odor None 08/05/21 0912   Anaya-wound Assessment Intact;Edematous 08/05/21 0912   Margins Undefined edges 08/05/21 0912   Wound Thickness Description not for Pressure Injury Partial thickness 08/05/21 0912   Number of days: 0       Wound 08/05/21 Leg Distal;Right;Medial;Lower #2 (Active)   Wound Image 08/05/21 1015   Wound Etiology Traumatic 08/05/21 0912   Wound Cleansed Cleansed with saline 08/05/21 0912   Dressing/Treatment Collagen 08/05/21 1015   Wound Length (cm) 0.7 cm 08/05/21 0912   Wound Width (cm) 0.6 cm 08/05/21 0912   Wound Depth (cm) 0.1 cm 08/05/21 0912   Wound Surface Area (cm^2) 0.42 cm^2 08/05/21 0912   Wound Volume (cm^3) 0.042 cm^3 08/05/21 0912   Post-Procedure Length (cm) 0.7 cm 08/05/21 1015   Post-Procedure Width (cm) 0.6 cm 08/05/21 1015   Post-Procedure Depth (cm) 0.1 cm 08/05/21 1015   Post-Procedure Surface Area (cm^2) 0.42 cm^2 08/05/21 1015   Post-Procedure Volume (cm^3) 0.042 cm^3 08/05/21 1015   Distance Tunneling (cm) 0 cm 08/05/21 0912   Undermining Maxium Distance (cm) 0 08/05/21 0912   Wound Assessment Pink/red 08/05/21 0912   Drainage Amount Small 08/05/21 0912   Drainage Description Brown 08/05/21 0912   Odor None 08/05/21 0912   Anaya-wound Assessment Intact 08/05/21 0912   Margins Defined edges 08/05/21 0912   Wound Thickness Description not for Pressure Injury Partial thickness 08/05/21 0912   Number of days: 0          Percent of Wound Debrided: 100%    Total Surface Area Debrided:  15 sq cm     Bleeding:  Minimal    Hemostasis Achieved:  by pressure    Procedural Pain:  0  / 10     Post Procedural Pain:  0 / 10     Response to treatment:  Well tolerated by patient. I believe a portion of the wound underwent necrosis due to the suture line being under tension      Plan:       Treatment Note please see attached Discharge Instructions    New Medication(s) at this visit:   New Prescriptions    No medications on file       Other orders at this visit: No orders of the defined types were placed in this encounter. Weight Management: No. N/A    Smoking Cessation: Counseling given: Not Answered        Discharge Instructions          [facility] Physician Orders and Discharge Instructions  [address] [address] [address] [address] [facility]. [facility] 103  Telephone: [phone] hands with soap and water prior to and after every dressing change. Wound Cleansing: (All wounds are cleaned with 0.9% saline during your wound care visit)   · Do not scrub or use excessive force. · With each dressing change, rinse wounds with 0.9% Saline. (May use wound wash or soft contact solution. Both can be purchased at a local drug store). · If unable to obtain saline, may use a gentle soap and water. · Keep wounds dry in the shower unless otherwise instructed by the physician. · For wounds on lower legs, cast covers can be purchased at local drug stores, so that you may shower and keep the wound(s) dry. [] Instructions for Vashe Wash solution ONLY: Apply enough Vashe to soak a piece of gauze and place on wound bed for 5-10 minutes. DO NOT rinse after Vashe has been applied. Follow dressing application as instructed below. [x] Vashe Wash applied during clinic visit. Anaya wound Topical Treatments:  Do not apply lotions, creams, or ointments to the skin around the wound bed unless directed as followed:     [] Apply around the wound: [] moisturizing lotion [] Antifungal ointment [] No-Sting barrier film [] Zinc paste [] Other:       Dressings:           Wound Location: right lower leg wounds      Apply Primary Dressing to wound:       Collagen      Cover and Secure with:     Cover and Secure with: 4X4 gauze pad   Avoid contact of tape with skin if possible.  When to change Dressing: [] Daily [] Every Other Day [] Once a week  [] Three times per week: [] Monday, Wednesday, Friday [] Tuesday, Thursday, Saturday  [x] Do Not Change Dressing [] Other:      Multilayer Compression Wrap:    Type: 2 Layer Lite compression wrap   · Applied in Clinic to the :  Right lower leg(s) on 8/5/2021  · Do not get leg(s) with wrap wet. · If wraps become too tight call the center or completely remove the wrap.    · Elevate leg(s) above the level of the heart when sitting. · Avoid prolonged standing in one place. Dietary:  Important dietary reminders:  1. Increase Protein intake (i.e. Lean meats, fish, eggs, legumes, and yogurt)  2. No added salt  3. If diabetic, follow a diabetic diet and check glucose prior to meals or as instructed by your physician. If you are still having pain after you go home:   For wounds on lower legs or arms, elevate the affected limb.  Use over-the-counter medications you would normally use for pain as permitted by your primary care doctor.  For persistent pain not relieved by the above interventions, please call your primary care doctor. Return Appointment:   Return Appointment: With Dr. Kaitlynn Garcia  in  [address] [place] [address])  o Scheduled weekly until (Date):      [] Return Appointment for a Wound Assessment with a nurse on:     [x]DME/Wound Dressing Supplies ordered at this visit: []Yes []No  o Supplies Provided by:   o Please call them directly to reorder supplies when you run out.  o CONTINUE TO USE THE SUPPLIES YOU HAVE AVAILABLE. IT IS MOST IMPORTANT TO KEEP THE WOUND COVERED AT ALL TIMES. [x] Orders placed during your visit: No orders of the defined types were placed in this encounter. Your nurse  is:  Kal White     Electronically signed by Melodie Brown RN on 8/5/2021 at 10:09 AM     [facility] Information: Should you experience any significant changes in your wound(s) or have questions about your wound care, please contact the [address] [place] [address] at [phone]. We are open from 8:00am - 4:30p Monday thru Friday except for Wednesdays which we are closed. Please give us 24-48 hours to return your call. Call your doctor now or seek immediate medical care if:    · You have symptoms of infection, such as:  ? Increased pain, swelling, warmth, or redness. ? Red streaks leading from the area. ? Pus draining from the area. ? A fever.         Physician for this visit and orders: Asenath Riedel Joni Woodward MD    [] Patient unable to sign Discharge Instructions given to ECF/Transportation/POA        Electronically signed by Noe David MD on 8/5/2021 at 11:31 AM

## 2021-08-05 NOTE — PLAN OF CARE
Multilayer Compression Wrap   (Not Unna) Below the Knee    NAME:  Debbie Flanagan  YOB: 1945  MEDICAL RECORD NUMBER:  [id number]  DATE:  8/5/2021        Removed old Multilayer wrap if present and washed leg with mild soap/water.   Applied moisturizing agent to dry skin as needed.   Applied primary and secondary dressing as ordered     Applied multilayered dressing below the knee to Right lower leg(s)  (2 Layer Lite compression wrap ) .   Instructed patient/caregiver not to remove dressing and to keep it clean and dry.   Instructed patient/caregiver on complications to report to provider, such as pain, numbness in toes, heavy drainage, and slippage of dressing.   Instructed patient on purpose of compression dressing and on activity and exercise recommendations.     Applied per   Guidelines    Electronically signed by Lord Rosalva RN on 8/5/2021 at 10:46 AM

## 2021-08-12 ENCOUNTER — HOSPITAL ENCOUNTER (OUTPATIENT)
Dept: WOUND CARE | Age: 76
Discharge: HOME | End: 2021-08-12
Payer: MEDICARE

## 2021-08-12 VITALS
SYSTOLIC BLOOD PRESSURE: 182 MMHG | DIASTOLIC BLOOD PRESSURE: 83 MMHG | HEART RATE: 80 BPM | TEMPERATURE: 96.9 F | RESPIRATION RATE: 16 BRPM
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
## 2021-08-12 DIAGNOSIS — S81.801D LEG WOUND, RIGHT, SUBSEQUENT ENCOUNTER: ICD-10-CM

## 2021-08-12 DIAGNOSIS — T81.89XD NON-HEALING SURGICAL WOUND, SUBSEQUENT ENCOUNTER: Primary | ICD-10-CM
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
## 2021-08-12 DIAGNOSIS — S81.801A LEG WOUND, RIGHT, INITIAL ENCOUNTER: ICD-10-CM

## 2021-08-12 DIAGNOSIS — T81.89XA NON-HEALING SURGICAL WOUND, INITIAL ENCOUNTER: ICD-10-CM

## 2021-08-12 PROCEDURE — 97597 DBRDMT OPN WND 1ST 20 CM/<: CPT | Performed by: SPECIALIST

## 2021-08-12 PROCEDURE — 11042 DBRDMT SUBQ TIS 1ST 20SQCM/<: CPT

## 2021-08-12 RX ORDER — BETAMETHASONE DIPROPIONATE 0.05 %
OINTMENT (GRAM) TOPICAL ONCE
Status: CANCELLED | OUTPATIENT
Start: 2021-08-12 | End: 2021-08-12

## 2021-08-12 RX ORDER — LIDOCAINE HYDROCHLORIDE 20 MG/ML
JELLY TOPICAL ONCE
Status: CANCELLED | OUTPATIENT
Start: 2021-08-12 | End: 2021-08-12

## 2021-08-12 RX ORDER — CLOBETASOL PROPIONATE 0.5 MG/G
OINTMENT TOPICAL ONCE
Status: CANCELLED | OUTPATIENT
Start: 2021-08-12 | End: 2021-08-12

## 2021-08-12 RX ORDER — GENTAMICIN SULFATE 1 MG/G
OINTMENT TOPICAL ONCE
Status: CANCELLED | OUTPATIENT
Start: 2021-08-12 | End: 2021-08-12

## 2021-08-12 RX ORDER — BACITRACIN, NEOMYCIN, POLYMYXIN B 400; 3.5; 5 [USP'U]/G; MG/G; [USP'U]/G
OINTMENT TOPICAL ONCE
Status: CANCELLED | OUTPATIENT
Start: 2021-08-12 | End: 2021-08-12

## 2021-08-12 RX ORDER — LIDOCAINE 40 MG/G
CREAM TOPICAL ONCE
Status: CANCELLED | OUTPATIENT
Start: 2021-08-12 | End: 2021-08-12

## 2021-08-12 RX ORDER — LIDOCAINE 50 MG/G
OINTMENT TOPICAL ONCE
Status: CANCELLED | OUTPATIENT
Start: 2021-08-12 | End: 2021-08-12

## 2021-08-12 RX ORDER — GINSENG 100 MG
CAPSULE ORAL ONCE
Status: CANCELLED | OUTPATIENT
Start: 2021-08-12 | End: 2021-08-12

## 2021-08-12 RX ORDER — BACITRACIN ZINC AND POLYMYXIN B SULFATE 500; 1000 [USP'U]/G; [USP'U]/G
OINTMENT TOPICAL ONCE
Status: CANCELLED | OUTPATIENT
Start: 2021-08-12 | End: 2021-08-12

## 2021-08-12 RX ORDER — LIDOCAINE HYDROCHLORIDE 40 MG/ML
SOLUTION TOPICAL ONCE
Status: DISCONTINUED | OUTPATIENT
Start: 2021-08-12 | End: 2021-08-13 | Stop reason: HOSPADM

## 2021-08-12 RX ORDER — LIDOCAINE HYDROCHLORIDE 40 MG/ML
SOLUTION TOPICAL ONCE
Status: CANCELLED | OUTPATIENT
Start: 2021-08-12 | End: 2021-08-12

## 2021-08-12 NOTE — PROGRESS NOTES
[facility]  Progress Note and Procedure Note      Roxana Woods  MEDICAL RECORD NUMBER:  [id number]  AGE: 77 y.o. GENDER: male  : 1945  EPISODE DATE:  2021    Subjective:     Chief Complaint   Patient presents with    Wound Check     right leg         HISTORY of PRESENT ILLNESS HPI     Roxana Woods is a 77 y.o. male who presents today for wound/ulcer evaluation. History of Wound Context: This patient was referred from the emergency room after having sustained a dog bite to the right lower extremity several weeks ago. He was initially seen in the emergency room the wound was primarily closed. Subsequently seen 10 days later for further evaluation. At that time it was decided he should be seen in wound care. It should be noted ER provider stated there was a great deal of tension on the wound with  nylon sutures still in place. He did not tolerate wearing a compression wrap this past week. Pain Assessment:  Wound/Ulcer Pain Timing/Severity: none  Quality of pain: N/A  Severity:  0 / 10   Modifying Factors: None  Associated Signs/Symptoms: none    Ulcer Identification:  Ulcer Type: non-healing surgical and traumatic  Contributing Factors: none    Objective:      BP (!) 182/83   Pulse 80   Temp 96.9 °F (36.1 °C) (Temporal)   Resp 16     Wt Readings from Last 3 Encounters:   21 125 lb 10.6 oz (57 kg)   21 130 lb (59 kg)   14 138 lb (62.6 kg)       PHYSICAL EXAM    Extremities: no cyanosis, clubbing or edema and eschar present lateral portion of surgical closure site right lower extremity    Small full-thickness wound with eschar medial right knee    Assessment:      1. Non-healing surgical wound, subsequent encounter    2. Dog bite, subsequent encounter    3. Leg wound, right, subsequent encounter    4. Leg wound, right, initial encounter    5. Non-healing surgical wound, initial encounter    6.  Dog bite, initial encounter         Procedure Note  Indications:  Based on my examination of this patient's wound(s) today, sharp excision is required to promote healing and evaluate the extent healing. Performed by: Bridgette Aburto MD    Consent obtained? Yes    Time out taken: Yes    Pain Control: Anesthetic: 4% Lidocaine Liquid Topical     Debridement:Excisional Debridement    Using curette the wound was sharply debrided    down through and including the removal of  epidermis and dermis. Devitalized Tissue Debrided:  fibrin, slough and necrotic/eschar      Pre Debridement Measurements:  Are located in the Wound Documentation Flow Sheet   Wound #: 1     Post  Debridement Measurements:  Wound 08/05/21 Leg Lateral;Right; Lower #1 (Active)   Wound Image   08/05/21 1015   Wound Etiology Traumatic 08/05/21 0912   Dressing Status New dressing applied;Clean;Dry; Intact 07/21/21 2230   Wound Cleansed Cleansed with saline 08/12/21 0910   Dressing/Treatment Xeroform 08/12/21 0949   Wound Length (cm) 6 cm 08/12/21 0910   Wound Width (cm) 2.5 cm 08/12/21 0910   Wound Depth (cm) 0.1 cm 08/12/21 0910   Wound Surface Area (cm^2) 15 cm^2 08/12/21 0910   Change in Wound Size % (l*w) 0 08/12/21 0910   Wound Volume (cm^3) 1.5 cm^3 08/12/21 0910   Wound Healing % 0 08/12/21 0910   Post-Procedure Length (cm) 6.1 cm 08/12/21 0940   Post-Procedure Width (cm) 2.6 cm 08/12/21 0940   Post-Procedure Depth (cm) 0.3 cm 08/12/21 0940   Post-Procedure Surface Area (cm^2) 15.86 cm^2 08/12/21 0940   Post-Procedure Volume (cm^3) 4.758 cm^3 08/12/21 0940   Tunneling Position ___ O'Clock 0 08/05/21 0912   Undermining Maxium Distance (cm) 0 08/05/21 0912   Wound Assessment Pink/red;Eschar dry 08/12/21 0910   Drainage Amount Small 08/12/21 0910   Drainage Description Brown 08/12/21 0910   Odor None 08/12/21 0910   Anaya-wound Assessment Intact 08/12/21 0910   Margins Defined edges 08/12/21 0910   Wound Thickness Description not for Pressure Injury Full thickness 08/12/21 0910   Number of days: 7       Wound 08/05/21 Leg Distal;Right;Medial;Lower #2 (Active)   Wound Image   08/05/21 1015   Wound Etiology Traumatic 08/05/21 0912   Wound Cleansed Cleansed with saline 08/12/21 0910   Dressing/Treatment Xeroform 08/12/21 0949   Wound Length (cm) 0.5 cm 08/12/21 0910   Wound Width (cm) 0.6 cm 08/12/21 0910   Wound Depth (cm) 0.1 cm 08/12/21 0910   Wound Surface Area (cm^2) 0.3 cm^2 08/12/21 0910   Change in Wound Size % (l*w) 28.57 08/12/21 0910   Wound Volume (cm^3) 0.03 cm^3 08/12/21 0910   Wound Healing % 29 08/12/21 0910   Post-Procedure Length (cm) 0.5 cm 08/12/21 0940   Post-Procedure Width (cm) 0.6 cm 08/12/21 0940   Post-Procedure Depth (cm) 0.1 cm 08/12/21 0940   Post-Procedure Surface Area (cm^2) 0.3 cm^2 08/12/21 0940   Post-Procedure Volume (cm^3) 0.03 cm^3 08/12/21 0940   Distance Tunneling (cm) 0 cm 08/05/21 0912   Undermining Maxium Distance (cm) 0 08/05/21 0912   Wound Assessment Eschar dry 08/12/21 0910   Drainage Amount Small 08/12/21 0910   Drainage Description Brown 08/12/21 0910   Odor None 08/12/21 0910   Anaya-wound Assessment Intact 08/12/21 0910   Margins Defined edges 08/12/21 0910   Wound Thickness Description not for Pressure Injury Partial thickness 08/12/21 0910   Number of days: 7          Percent of Wound Debrided: 100%    Total Surface Area Debrided:  15 sq cm    Diabetic/Pressure/Non Pressure Ulcers only:  Ulcer: Non-Pressure ulcer, limited to breakdown of skin    Bleeding: Minimal    Hemostasis Achieved: by pressure    Procedural Pain: 0  / 10     Post Procedural Pain: 0 / 10     Response to treatment:  Well tolerated by patient. Plan:     Treatment Note: Please see attached Discharge Instructions.   These instructions were given and signed by the patient or POA    New Medication(s) at this visit:   New Prescriptions    No medications on file       Other orders at this visit:   Orders Placed This Encounter   Procedures    Initiate Outpatient Wound Care Protocol Discharge [facility] Physician Orders and Discharge Instructions  [address] [address] [address] [address] [facility]. [facility] 103  Telephone: [phone] hands with soap and water prior to and after every dressing change. Wound Cleansing: (All wounds are cleaned with 0.9% saline during your wound care visit)   · Do not scrub or use excessive force. · With each dressing change, rinse wounds with 0.9% Saline. (May use wound wash or soft contact solution. Both can be purchased at a local drug store). · If unable to obtain saline, may use a gentle soap and water. · Keep wounds dry in the shower unless otherwise instructed by the physician. · For wounds on lower legs, cast covers can be purchased at local drug stores, so that you may shower and keep the wound(s) dry. [] Instructions for Vashe Wash solution ONLY: Apply enough Vashe to soak a piece of gauze and place on wound bed for 5-10 minutes. DO NOT rinse after Vashe has been applied. Follow dressing application as instructed below. [] Vashe Wash applied during clinic visit. Anaya wound Topical Treatments:  Do not apply lotions, creams, or ointments to the skin around the wound bed unless directed as followed:     [] Apply around the wound: [] moisturizing lotion [] Antifungal ointment [] No-Sting barrier film [] Zinc paste [] Other:       Dressings:           Wound Location: right lower leg     Apply Primary Dressing to wound: Other: xeroform gauze     Cover and Secure with:     Cover and Secure with: 4X4 gauze pad  Bulky roll gauze   Avoid contact of tape with skin if possible.  When to change Dressing: [] Daily [] Every Other Day [] Once a week  [] Three times per week: [] Monday, Wednesday, Friday [] Tuesday, Thursday, Saturday  [] Do Not Change Dressing [x] Other: change twice a week: Monday and Thursdays    Dietary:  Important dietary reminders:  1. ECF/Transportation/POA        Electronically signed by Koffi Verma MD on 8/12/2021 at 10:44 AM

## 2021-08-19 ENCOUNTER — HOSPITAL ENCOUNTER (OUTPATIENT)
Dept: WOUND CARE | Age: 76
Discharge: HOME | End: 2021-08-19
Payer: MEDICARE

## 2021-08-19 VITALS
SYSTOLIC BLOOD PRESSURE: 187 MMHG | RESPIRATION RATE: 16 BRPM | TEMPERATURE: 96.8 F | DIASTOLIC BLOOD PRESSURE: 83 MMHG | HEART RATE: 64 BPM

## 2021-08-19 DIAGNOSIS — T81.89XA NON-HEALING SURGICAL WOUND, INITIAL ENCOUNTER: ICD-10-CM

## 2021-08-19 DIAGNOSIS — T81.89XD NON-HEALING SURGICAL WOUND, SUBSEQUENT ENCOUNTER: Primary | ICD-10-CM

## 2021-08-19 DIAGNOSIS — S81.801A LEG WOUND, RIGHT, INITIAL ENCOUNTER: ICD-10-CM

## 2021-08-19 DIAGNOSIS — S81.801D LEG WOUND, RIGHT, SUBSEQUENT ENCOUNTER: ICD-10-CM
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
## 2021-08-19 PROCEDURE — 11042 DBRDMT SUBQ TIS 1ST 20SQCM/<: CPT

## 2021-08-19 PROCEDURE — 11042 DBRDMT SUBQ TIS 1ST 20SQCM/<: CPT | Performed by: SPECIALIST

## 2021-08-19 PROCEDURE — 6370000000 HC RX 637 (ALT 250 FOR IP): Performed by: SPECIALIST

## 2021-08-19 RX ORDER — LIDOCAINE HYDROCHLORIDE 20 MG/ML
JELLY TOPICAL ONCE
Status: CANCELLED | OUTPATIENT
Start: 2021-08-19 | End: 2021-08-19

## 2021-08-19 RX ORDER — GINSENG 100 MG
CAPSULE ORAL ONCE
Status: CANCELLED | OUTPATIENT
Start: 2021-08-19 | End: 2021-08-19

## 2021-08-19 RX ORDER — LIDOCAINE 50 MG/G
OINTMENT TOPICAL ONCE
Status: CANCELLED | OUTPATIENT
Start: 2021-08-19 | End: 2021-08-19

## 2021-08-19 RX ORDER — LIDOCAINE 40 MG/G
CREAM TOPICAL ONCE
Status: CANCELLED | OUTPATIENT
Start: 2021-08-19 | End: 2021-08-19

## 2021-08-19 RX ORDER — CLOBETASOL PROPIONATE 0.5 MG/G
OINTMENT TOPICAL ONCE
Status: CANCELLED | OUTPATIENT
Start: 2021-08-19 | End: 2021-08-19

## 2021-08-19 RX ORDER — LIDOCAINE HYDROCHLORIDE 40 MG/ML
SOLUTION TOPICAL ONCE
Status: COMPLETED | OUTPATIENT
Start: 2021-08-19 | End: 2021-08-19

## 2021-08-19 RX ORDER — GENTAMICIN SULFATE 1 MG/G
OINTMENT TOPICAL ONCE
Status: CANCELLED | OUTPATIENT
Start: 2021-08-19 | End: 2021-08-19

## 2021-08-19 RX ORDER — LIDOCAINE HYDROCHLORIDE 40 MG/ML
SOLUTION TOPICAL ONCE
Status: CANCELLED | OUTPATIENT
Start: 2021-08-19 | End: 2021-08-19

## 2021-08-19 RX ORDER — BETAMETHASONE DIPROPIONATE 0.05 %
OINTMENT (GRAM) TOPICAL ONCE
Status: CANCELLED | OUTPATIENT
Start: 2021-08-19 | End: 2021-08-19

## 2021-08-19 RX ORDER — BACITRACIN ZINC AND POLYMYXIN B SULFATE 500; 1000 [USP'U]/G; [USP'U]/G
OINTMENT TOPICAL ONCE
Status: CANCELLED | OUTPATIENT
Start: 2021-08-19 | End: 2021-08-19

## 2021-08-19 RX ORDER — BACITRACIN, NEOMYCIN, POLYMYXIN B 400; 3.5; 5 [USP'U]/G; MG/G; [USP'U]/G
OINTMENT TOPICAL ONCE
Status: CANCELLED | OUTPATIENT
Start: 2021-08-19 | End: 2021-08-19

## 2021-08-19 RX ADMIN — LIDOCAINE HYDROCHLORIDE: 40 SOLUTION TOPICAL at 09:18

## 2021-08-19 NOTE — PROGRESS NOTES
[facility]  Progress Note and Procedure Note      Neha Mcbride  MEDICAL RECORD NUMBER:  [id number]  AGE: 77 y.o. GENDER: male  : 1945  EPISODE DATE:  2021    Subjective:     Chief Complaint   Patient presents with    Wound Check     right leg         HISTORY of PRESENT ILLNESS HPI     Neha Mcbride is a 77 y.o. male who presents today for wound/ulcer evaluation. History of Wound Context: This patient was referred from the emergency room after having sustained a dog bite to the right lower extremity several weeks ago. Cielo Raymond was initially seen in the emergency room the wound was primarily closed.  Subsequently seen 10 days later for further evaluation.  At that time it was decided he should be seen in wound care.  It should be noted ER provider stated there was a great deal of tension on the wound with  nylon sutures still in place. He did not tolerate wearing a compression wrap this past week.     Pain Assessment:  Wound/Ulcer Pain Timing/Severity: none  Quality of pain: N/A  Severity:  0 / 10   Modifying Factors: None  Associated Signs/Symptoms: none    Ulcer Identification:  Ulcer Type: non-healing surgical and traumatic  Contributing Factors: none    Objective:      BP (!) 187/83   Pulse 64   Temp 96.8 °F (36 °C) (Temporal)   Resp 16     Wt Readings from Last 3 Encounters:   21 125 lb 10.6 oz (57 kg)   21 130 lb (59 kg)   14 138 lb (62.6 kg)       PHYSICAL EXAM    Extremities: no cyanosis, clubbing or edema and eschar present lateral portion of surgical closure site right lower extremity    Assessment:      1. Non-healing surgical wound, subsequent encounter    2. Dog bite, subsequent encounter    3. Leg wound, right, subsequent encounter    4. Leg wound, right, initial encounter    5. Non-healing surgical wound, initial encounter    6.  Dog bite, initial encounter         Procedure Note  Indications:  Based on my examination of this patient's wound(s) today, sharp excision is required to promote healing and evaluate the extent healing. Performed by: Marleen Kirkland MD    Consent obtained? Yes    Time out taken: Yes    Pain Control: Anesthetic: 4% Lidocaine Liquid Topical     Debridement:Excisional Debridement    Using curette the wound was sharply debrided    down through and including the removal of  epidermis, dermis and subcutaneous tissue. Devitalized Tissue Debrided:  fibrin and necrotic/eschar      Pre Debridement Measurements:  Are located in the Wound Documentation Flow Sheet   Wound #: 1     Post  Debridement Measurements:  Wound 08/05/21 Leg Lateral;Right; Lower #1 (Active)   Wound Image   08/19/21 1003   Wound Etiology Traumatic 08/05/21 0912   Dressing Status New dressing applied;Clean;Dry; Intact 07/21/21 2230   Wound Cleansed Cleansed with saline 08/19/21 0919   Dressing/Treatment Alginate with Ag 08/19/21 1003   Wound Length (cm) 5.5 cm 08/19/21 0919   Wound Width (cm) 2.4 cm 08/19/21 0919   Wound Depth (cm) 0.1 cm 08/19/21 0919   Wound Surface Area (cm^2) 13.2 cm^2 08/19/21 0919   Change in Wound Size % (l*w) 12 08/19/21 0919   Wound Volume (cm^3) 1.32 cm^3 08/19/21 0919   Wound Healing % 12 08/19/21 0919   Post-Procedure Length (cm) 5.6 cm 08/19/21 0956   Post-Procedure Width (cm) 2.5 cm 08/19/21 0956   Post-Procedure Depth (cm) 0.3 cm 08/19/21 0956   Post-Procedure Surface Area (cm^2) 14 cm^2 08/19/21 0956   Post-Procedure Volume (cm^3) 4.2 cm^3 08/19/21 0956   Distance Tunneling (cm) 0 cm 08/19/21 0919   Tunneling Position ___ O'Clock 0 08/05/21 0912   Undermining Maxium Distance (cm) 0 08/19/21 0919   Wound Assessment Pink/red;Eschar dry;Fibrinous 08/19/21 0919   Drainage Amount Small 08/19/21 0919   Drainage Description Brown 08/19/21 0919   Odor None 08/19/21 0919   Anaya-wound Assessment Intact 08/19/21 0919   Margins Undefined edges 08/19/21 0919   Wound Thickness Description not for Pressure Injury Full thickness 08/19/21 6130   Number of days: 14          Percent of Wound Debrided: 100%    Total Surface Area Debrided:  14 sq cm    Diabetic/Pressure/Non Pressure Ulcers only:  Ulcer: Non-Pressure ulcer, fat layer exposed    Bleeding: Minimal    Hemostasis Achieved: by pressure    Procedural Pain: 0  / 10     Post Procedural Pain: 0 / 10     Response to treatment:  Well tolerated by patient. Plan:     Treatment Note: Please see attached Discharge Instructions. These instructions were given and signed by the patient or POA    New Medication(s) at this visit:   New Prescriptions    No medications on file       Other orders at this visit:   Orders Placed This Encounter   Procedures   [id number] [address] Shaw Protocol       Discharge Instructions          [facility] Physician Orders and Discharge Instructions  [address] [address] [address]  Telephone: [phone] hands with soap and water prior to and after every dressing change. Wound Cleansing: (All wounds are cleaned with 0.9% saline during your wound care visit)   · Do not scrub or use excessive force. · With each dressing change, rinse wounds with 0.9% Saline. (May use wound wash or soft contact solution. Both can be purchased at a local drug store). · If unable to obtain saline, may use a gentle soap and water. · Keep wounds dry in the shower unless otherwise instructed by the physician. · For wounds on lower legs, cast covers can be purchased at local drug stores, so that you may shower and keep the wound(s) dry. [] Instructions for Vashe Wash solution ONLY: Apply enough Vashe to soak a piece of gauze and place on wound bed for 5-10 minutes. DO NOT rinse after Vashe has been applied. Follow dressing application as instructed below. [x] Vashe Wash applied during clinic visit.     Anaya wound Topical Treatments:  Do not apply lotions, creams, or ointments to the skin around the wound bed unless directed as followed:     [] Apply around the wound: [] moisturizing lotion [] Antifungal ointment [] No-Sting barrier film [] Zinc paste [] Other:       Dressings:           Wound Location: right lower leg      Apply Primary Dressing to wound:       Alginate with silver pad     Cover and Secure with:     Cover with 4x4 gauze, kerlix   Avoid contact of tape with skin if possible.  When to change Dressing: [] Daily [] Every Other Day [] Once a week  [] Three times per week: [] Monday, Wednesday, Friday [x] Tuesday, Thursday, Saturday  [] Do Not Change Dressing [] Other:     Edema Control:  Apply: [] Compression Stocking  [] Left Lower Leg [] Right Lower Leg     [x]  Spandagrip:Strength: High compression  20-30 mm/Hg    [] Left Lower Leg [x] Right Lower Leg        Apply every morning immediately when getting up. They should be applied to affected leg(s) from mid foot to knee making sure to cover the heel. Remove every night before going to bed. [x] Elevate leg(s) above the level of the heart for 30 minutes 4-5 times a day and/or when sitting. [x] Avoid prolonged standing in one place. Dietary:  Important dietary reminders:  1. Increase Protein intake (i.e. Lean meats, fish, eggs, legumes, and yogurt)  2. No added salt  3. If diabetic, follow a diabetic diet and check glucose prior to meals or as instructed by your physician. If you are still having pain after you go home:   For wounds on lower legs or arms, elevate the affected limb.  Use over-the-counter medications you would normally use for pain as permitted by your primary care doctor.  For persistent pain not relieved by the above interventions, please call your primary care doctor.      Return Appointment:   Return Appointment: With Dr. Isidoro Krause  in  [address] [place] [address])  o Scheduled weekly until (Date):      [] Return Appointment for a Wound Assessment with a nurse on:     [x]DME/Wound Dressing Supplies ordered at this visit: []Yes []No  o Supplies Provided by:   o CONTINUE TO USE THE SUPPLIES YOU HAVE AVAILABLE. IT IS MOST IMPORTANT TO KEEP THE WOUND COVERED AT ALL TIMES. [x] Orders placed during your visit:   Orders Placed This Encounter   Procedures    Initiate Outpatient Wound Care Protocol       Your nurse  is:  Greyson Glaser     Electronically signed by Asher Gibbs RN on 8/19/2021 at 9:58 AM     [address] [place] [address] Information: Should you experience any significant changes in your wound(s) or have questions about your wound care, please contact the [address] [place] [address] at [phone]. We are open from 8:00am - 4:30p Monday thru Friday except for Wednesdays which we are closed. Please give us 24-48 hours to return your call. Call your doctor now or seek immediate medical care if:    · You have symptoms of infection, such as:  ? Increased pain, swelling, warmth, or redness. ? Red streaks leading from the area. ? Pus draining from the area. ? A fever.         Physician for this visit and orders: Justice Adler MD    [] Patient unable to sign Discharge Instructions given to ECF/Transportation/POA        Electronically signed by Justice Adler MD on 8/19/2021 at 12:38 PM

## 2021-08-26 ENCOUNTER — HOSPITAL ENCOUNTER (OUTPATIENT)
Dept: WOUND CARE | Age: 76
Discharge: HOME | End: 2021-08-26
Payer: MEDICARE

## 2021-08-26 DIAGNOSIS — T81.89XD NON-HEALING SURGICAL WOUND, SUBSEQUENT ENCOUNTER: Primary | ICD-10-CM
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
## 2021-08-26 DIAGNOSIS — S81.801A LEG WOUND, RIGHT, INITIAL ENCOUNTER: ICD-10-CM
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
## 2021-08-26 DIAGNOSIS — S81.801D LEG WOUND, RIGHT, SUBSEQUENT ENCOUNTER: ICD-10-CM

## 2021-08-26 DIAGNOSIS — T81.89XA NON-HEALING SURGICAL WOUND, INITIAL ENCOUNTER: ICD-10-CM

## 2021-08-26 PROCEDURE — 11042 DBRDMT SUBQ TIS 1ST 20SQCM/<: CPT | Performed by: SPECIALIST

## 2021-08-26 PROCEDURE — 11042 DBRDMT SUBQ TIS 1ST 20SQCM/<: CPT

## 2021-08-26 PROCEDURE — 6370000000 HC RX 637 (ALT 250 FOR IP): Performed by: SPECIALIST

## 2021-08-26 RX ORDER — LIDOCAINE HYDROCHLORIDE 20 MG/ML
JELLY TOPICAL ONCE
Status: CANCELLED | OUTPATIENT
Start: 2021-08-26 | End: 2021-08-26

## 2021-08-26 RX ORDER — LIDOCAINE 50 MG/G
OINTMENT TOPICAL ONCE
Status: CANCELLED | OUTPATIENT
Start: 2021-08-26 | End: 2021-08-26

## 2021-08-26 RX ORDER — CLOBETASOL PROPIONATE 0.5 MG/G
OINTMENT TOPICAL ONCE
Status: CANCELLED | OUTPATIENT
Start: 2021-08-26 | End: 2021-08-26

## 2021-08-26 RX ORDER — LIDOCAINE HYDROCHLORIDE 40 MG/ML
SOLUTION TOPICAL ONCE
Status: CANCELLED | OUTPATIENT
Start: 2021-08-26 | End: 2021-08-26

## 2021-08-26 RX ORDER — LIDOCAINE HYDROCHLORIDE 40 MG/ML
SOLUTION TOPICAL ONCE
Status: COMPLETED | OUTPATIENT
Start: 2021-08-26 | End: 2021-08-26

## 2021-08-26 RX ORDER — GINSENG 100 MG
CAPSULE ORAL ONCE
Status: CANCELLED | OUTPATIENT
Start: 2021-08-26 | End: 2021-08-26

## 2021-08-26 RX ORDER — BACITRACIN, NEOMYCIN, POLYMYXIN B 400; 3.5; 5 [USP'U]/G; MG/G; [USP'U]/G
OINTMENT TOPICAL ONCE
Status: CANCELLED | OUTPATIENT
Start: 2021-08-26 | End: 2021-08-26

## 2021-08-26 RX ORDER — GENTAMICIN SULFATE 1 MG/G
OINTMENT TOPICAL ONCE
Status: CANCELLED | OUTPATIENT
Start: 2021-08-26 | End: 2021-08-26

## 2021-08-26 RX ORDER — LIDOCAINE 40 MG/G
CREAM TOPICAL ONCE
Status: CANCELLED | OUTPATIENT
Start: 2021-08-26 | End: 2021-08-26

## 2021-08-26 RX ORDER — BACITRACIN ZINC AND POLYMYXIN B SULFATE 500; 1000 [USP'U]/G; [USP'U]/G
OINTMENT TOPICAL ONCE
Status: CANCELLED | OUTPATIENT
Start: 2021-08-26 | End: 2021-08-26

## 2021-08-26 RX ORDER — BETAMETHASONE DIPROPIONATE 0.05 %
OINTMENT (GRAM) TOPICAL ONCE
Status: CANCELLED | OUTPATIENT
Start: 2021-08-26 | End: 2021-08-26

## 2021-08-26 RX ADMIN — LIDOCAINE HYDROCHLORIDE: 40 SOLUTION TOPICAL at 09:15

## 2021-08-26 NOTE — PROGRESS NOTES
1227 [facility]  Progress Note and Procedure Note      Harris Alberto  MEDICAL RECORD NUMBER:  [id number]  AGE: 77 y.o. GENDER: male  : 1945  EPISODE DATE:  2021    Subjective:     Chief Complaint   Patient presents with    Wound Check         HISTORY of PRESENT ILLNESS HPI     Harris Alberto is a 77 y.o. male who presents today for wound/ulcer evaluation. History of Wound Context: This patient was referred from the emergency room after having sustained a dog bite to the right lower extremity several weeks ago. Leonard Tariq was initially seen in the emergency room the wound was primarily closed.  Subsequently seen 10 days later for further evaluation.  At that time it was decided he should be seen in wound care.  It should be noted ER provider stated there was a great deal of tension on the wound with  nylon sutures still in place. He did not tolerate wearing a compression wrap this past week. Pain Assessment:  Wound/Ulcer Pain Timing/Severity: none  Quality of pain: N/A  Severity:  0 / 10   Modifying Factors: None  Associated Signs/Symptoms: none    Ulcer Identification:  Ulcer Type: non-healing surgical and traumatic  Contributing Factors: none    Objective: There were no vitals taken for this visit. Wt Readings from Last 3 Encounters:   21 125 lb 10.6 oz (57 kg)   21 130 lb (59 kg)   14 138 lb (62.6 kg)       PHYSICAL EXAM    Extremities: no cyanosis, clubbing or edema and full-thickness wound containing fibrin, slough presence of granulation tissue lateral portion of surgical closure site right lower extremity    Assessment:      1. Non-healing surgical wound, subsequent encounter    2. Dog bite, subsequent encounter    3. Leg wound, right, subsequent encounter    4. Leg wound, right, initial encounter    5. Non-healing surgical wound, initial encounter    6.  Dog bite, initial encounter         Procedure Note  Indications:  Based on my examination of this patient's wound(s) today, sharp excision is required to promote healing and evaluate the extent healing. Performed by: Benito Justin MD    Consent obtained? Yes    Time out taken: Yes    Pain Control: Anesthetic: 4% Lidocaine Liquid Topical     Debridement:Excisional Debridement    Using curette the wound was sharply debrided    down through and including the removal of  epidermis, dermis and subcutaneous tissue. Devitalized Tissue Debrided:  fibrin and slough      Pre Debridement Measurements:  Are located in the Wound Documentation Flow Sheet   Wound #: 1     Post  Debridement Measurements:  Wound 08/05/21 Leg Lateral;Right; Lower #1 (Active)   Wound Image   08/19/21 1003   Wound Etiology Traumatic 08/05/21 0912   Wound Cleansed Cleansed with saline 08/26/21 0906   Dressing/Treatment Alginate with Ag 08/26/21 0958   Wound Length (cm) 4.9 cm 08/26/21 0906   Wound Width (cm) 3.9 cm 08/26/21 0906   Wound Depth (cm) 0.1 cm 08/26/21 0906   Wound Surface Area (cm^2) 19.11 cm^2 08/26/21 0906   Change in Wound Size % (l*w) -27.4 08/26/21 0906   Wound Volume (cm^3) 1.911 cm^3 08/26/21 0906   Wound Healing % -27 08/26/21 0906   Post-Procedure Length (cm) 5 cm 08/26/21 0947   Post-Procedure Width (cm) 4 cm 08/26/21 0947   Post-Procedure Depth (cm) 0.3 cm 08/26/21 0947   Post-Procedure Surface Area (cm^2) 20 cm^2 08/26/21 0947   Post-Procedure Volume (cm^3) 6 cm^3 08/26/21 0947   Distance Tunneling (cm) 0 cm 08/26/21 0906   Tunneling Position ___ O'Clock 0 08/05/21 0912   Undermining Maxium Distance (cm) 0 08/26/21 0906   Wound Assessment Slough;Pink/red;Fibrin;Devitalized tissue 08/26/21 0906   Drainage Amount Small 08/26/21 0906   Drainage Description Purulent;Yellow;Brown 08/26/21 0906   Odor None 08/26/21 0906   Anaya-wound Assessment Intact 08/26/21 0906   Margins Defined edges 08/26/21 0906   Wound Thickness Description not for Pressure Injury Full thickness 08/26/21 0906   Number of days: 21 directed as followed:     [] Apply around the wound: [] moisturizing lotion [] Antifungal ointment [] No-Sting barrier film [] Zinc paste [] Other:       Dressings:           Wound Location: right lower leg wound    Apply Primary Dressing to wound:       Alginate with silver pad     Cover and Secure with:     Cover and Secure with: 4X4 gauze pad  Bulky roll gauze   Avoid contact of tape with skin if possible.  When to change Dressing: [] Daily [] Every Other Day [] Once a week  [x] Three times per week: [] Monday, Wednesday, Friday [x] Tuesday, Thursday, Saturday  [] Do Not Change Dressing [] Other:     Edema Control:  Apply: [] Compression Stocking  [] Left Lower Leg [] Right Lower Leg     [x]  Spandagrip:Strength: High compression  20-30 mm/Hg    [] Left Lower Leg [x] Right Lower Leg        Apply every morning immediately when getting up. They should be applied to affected leg(s) from mid foot to knee making sure to cover the heel. Remove every night before going to bed. [x] Elevate leg(s) above the level of the heart for 30 minutes 4-5 times a day and/or when sitting. [x] Avoid prolonged standing in one place. Dietary:  Important dietary reminders:  1. Increase Protein intake (i.e. Lean meats, fish, eggs, legumes, and yogurt)  2. No added salt  3. If diabetic, follow a diabetic diet and check glucose prior to meals or as instructed by your physician. Dietary Supplements:  []Bernardo  []30ml ProStat  []EnsureEnlive []Ensure Max/Premier  []Other:    If you are still having pain after you go home:   For wounds on lower legs or arms, elevate the affected limb.  Use over-the-counter medications you would normally use for pain as permitted by your primary care doctor.  For persistent pain not relieved by the above interventions, please call your primary care doctor.      Return Appointment:   Return Appointment: With Dr. Luz Oliva  in  [address] [place] [address])  o Scheduled weekly until (Date):      [] Return Appointment for a Wound Assessment with a nurse on:     [x]DME/Wound Dressing Supplies ordered at this visit: []Yes []No  o Supplies Provided by:   o Please call them directly to reorder supplies when you run out.  o CONTINUE TO USE THE SUPPLIES YOU HAVE AVAILABLE. IT IS MOST IMPORTANT TO KEEP THE WOUND COVERED AT ALL TIMES. [x] Orders placed during your visit:   Orders Placed This Encounter   Procedures    Initiate Outpatient Wound Care Protocol       Your nurse  is: Charla Lester     Electronically signed by Boyd Garcia RN on 8/26/2021 at 9:47 AM     [facility] Information: Should you experience any significant changes in your wound(s) or have questions about your wound care, please contact the [address] [place] [address] at [phone]. We are open from 8:00am - 4:30p Monday thru Friday except for Wednesdays which we are closed. Please give us 24-48 hours to return your call. Call your doctor now or seek immediate medical care if:    · You have symptoms of infection, such as:  ? Increased pain, swelling, warmth, or redness. ? Red streaks leading from the area. ? Pus draining from the area. ? A fever.         Physician for this visit and orders: Leigh Ann Wayne MD    [] Patient unable to sign Discharge Instructions given to ECF/Transportation/POA        Electronically signed by Leigh Ann Wayne MD on 8/26/2021 at 11:35 AM

## 2021-09-02 ENCOUNTER — HOSPITAL ENCOUNTER (OUTPATIENT)
Dept: WOUND CARE | Age: 76
Discharge: HOME | End: 2021-09-02
Payer: MEDICARE

## 2021-09-02 VITALS
TEMPERATURE: 97 F | HEART RATE: 57 BPM | WEIGHT: 125.66 LBS | SYSTOLIC BLOOD PRESSURE: 180 MMHG | DIASTOLIC BLOOD PRESSURE: 76 MMHG | RESPIRATION RATE: 16 BRPM | BODY MASS INDEX: 18.56 KG/M2

## 2021-09-02 DIAGNOSIS — S81.801A LEG WOUND, RIGHT, INITIAL ENCOUNTER: ICD-10-CM
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
## 2021-09-02 DIAGNOSIS — T81.89XA NON-HEALING SURGICAL WOUND, INITIAL ENCOUNTER: ICD-10-CM
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
## 2021-09-02 DIAGNOSIS — S81.801D LEG WOUND, RIGHT, SUBSEQUENT ENCOUNTER: ICD-10-CM

## 2021-09-02 DIAGNOSIS — T81.89XD NON-HEALING SURGICAL WOUND, SUBSEQUENT ENCOUNTER: Primary | ICD-10-CM

## 2021-09-02 PROCEDURE — 11042 DBRDMT SUBQ TIS 1ST 20SQCM/<: CPT | Performed by: SPECIALIST

## 2021-09-02 PROCEDURE — 11042 DBRDMT SUBQ TIS 1ST 20SQCM/<: CPT

## 2021-09-02 RX ORDER — LIDOCAINE HYDROCHLORIDE 40 MG/ML
SOLUTION TOPICAL ONCE
Status: DISCONTINUED | OUTPATIENT
Start: 2021-09-02 | End: 2021-09-03 | Stop reason: HOSPADM

## 2021-09-02 RX ORDER — BACITRACIN ZINC AND POLYMYXIN B SULFATE 500; 1000 [USP'U]/G; [USP'U]/G
OINTMENT TOPICAL ONCE
Status: CANCELLED | OUTPATIENT
Start: 2021-09-02 | End: 2021-09-02

## 2021-09-02 RX ORDER — GENTAMICIN SULFATE 1 MG/G
OINTMENT TOPICAL ONCE
Status: CANCELLED | OUTPATIENT
Start: 2021-09-02 | End: 2021-09-02

## 2021-09-02 RX ORDER — LIDOCAINE HYDROCHLORIDE 20 MG/ML
JELLY TOPICAL ONCE
Status: CANCELLED | OUTPATIENT
Start: 2021-09-02 | End: 2021-09-02

## 2021-09-02 RX ORDER — LIDOCAINE 50 MG/G
OINTMENT TOPICAL ONCE
Status: CANCELLED | OUTPATIENT
Start: 2021-09-02 | End: 2021-09-02

## 2021-09-02 RX ORDER — BACITRACIN, NEOMYCIN, POLYMYXIN B 400; 3.5; 5 [USP'U]/G; MG/G; [USP'U]/G
OINTMENT TOPICAL ONCE
Status: CANCELLED | OUTPATIENT
Start: 2021-09-02 | End: 2021-09-02

## 2021-09-02 RX ORDER — LIDOCAINE 40 MG/G
CREAM TOPICAL ONCE
Status: CANCELLED | OUTPATIENT
Start: 2021-09-02 | End: 2021-09-02

## 2021-09-02 RX ORDER — CLOBETASOL PROPIONATE 0.5 MG/G
OINTMENT TOPICAL ONCE
Status: CANCELLED | OUTPATIENT
Start: 2021-09-02 | End: 2021-09-02

## 2021-09-02 RX ORDER — GINSENG 100 MG
CAPSULE ORAL ONCE
Status: CANCELLED | OUTPATIENT
Start: 2021-09-02 | End: 2021-09-02

## 2021-09-02 RX ORDER — BETAMETHASONE DIPROPIONATE 0.05 %
OINTMENT (GRAM) TOPICAL ONCE
Status: CANCELLED | OUTPATIENT
Start: 2021-09-02 | End: 2021-09-02

## 2021-09-02 RX ORDER — LIDOCAINE HYDROCHLORIDE 40 MG/ML
SOLUTION TOPICAL ONCE
Status: CANCELLED | OUTPATIENT
Start: 2021-09-02 | End: 2021-09-02

## 2021-09-02 NOTE — PROGRESS NOTES
1227 [facility]  Progress Note and Procedure Note      Latia Cristobal  MEDICAL RECORD NUMBER:  [id number]  AGE: 77 y.o. GENDER: male  : 1945  EPISODE DATE:  2021    Subjective:     Chief Complaint   Patient presents with    Wound Check     right lower leg         HISTORY of PRESENT ILLNESS HPI     Latia Cristobal is a 77 y.o. male who presents today for wound/ulcer evaluation. History of Wound Context: This patient was referred from the emergency room after having sustained a dog bite to the right lower extremity several weeks ago. [facility] was initially seen in the emergency room the wound was primarily closed.  Subsequently seen 10 days later for further evaluation.  At that time it was decided he should be seen in wound care.  It should be noted ER provider stated there was a great deal of tension on the wound with  nylon sutures still in place    Pain Assessment:  Wound/Ulcer Pain Timing/Severity: none  Quality of pain: N/A  Severity:  0 / 10   Modifying Factors: None  Associated Signs/Symptoms: none    Ulcer Identification:  Ulcer Type: non-healing surgical and traumatic  Contributing Factors: none    Objective:      BP (!) 180/76   Pulse 57   Temp 97 °F (36.1 °C) (Temporal)   Resp 16   Wt 125 lb 10.6 oz (57 kg)   BMI 18.56 kg/m²     Wt Readings from Last 3 Encounters:   21 125 lb 10.6 oz (57 kg)   21 125 lb 10.6 oz (57 kg)   21 130 lb (59 kg)       PHYSICAL EXAM    Extremities: no cyanosis, clubbing or edema and full-thickness wound with granulation tissue with fibrin and slough lateral portion of old surgical surgical closure site        Assessment:      1. Non-healing surgical wound, subsequent encounter    2. Dog bite, subsequent encounter    3. Leg wound, right, subsequent encounter    4. Leg wound, right, initial encounter    5. Non-healing surgical wound, initial encounter    6.  Dog bite, initial encounter         Procedure Note  Indications: Based on my examination of this patient's wound(s) today, sharp excision is required to promote healing and evaluate the extent healing. Performed by: Ashlie Epstein MD    Consent obtained? Yes    Time out taken: Yes    Pain Control: Anesthetic: 4% Lidocaine Liquid Topical     Debridement:Excisional Debridement    Using curette the wound was sharply debrided    down through and including the removal of  epidermis, dermis and subcutaneous tissue. Devitalized Tissue Debrided:  fibrin and slough      Pre Debridement Measurements:  Are located in the Wound Documentation Flow Sheet   Wound #: 1     Post  Debridement Measurements:  Wound 08/05/21 Leg Lateral;Right; Lower #1 (Active)   Wound Image   09/02/21 0903   Wound Etiology Traumatic 08/05/21 0912   Wound Cleansed Cleansed with saline 08/26/21 0906   Dressing/Treatment Collagen 09/02/21 0946   Wound Length (cm) 4.3 cm 09/02/21 0903   Wound Width (cm) 3.5 cm 09/02/21 0903   Wound Depth (cm) 0.1 cm 09/02/21 0903   Wound Surface Area (cm^2) 15.05 cm^2 09/02/21 0903   Change in Wound Size % (l*w) -0.33 09/02/21 0903   Wound Volume (cm^3) 1.505 cm^3 09/02/21 0903   Wound Healing % 0 09/02/21 0903   Post-Procedure Length (cm) 4.4 cm 09/02/21 0922   Post-Procedure Width (cm) 3.6 cm 09/02/21 0922   Post-Procedure Depth (cm) 0.3 cm 09/02/21 0922   Post-Procedure Surface Area (cm^2) 15.84 cm^2 09/02/21 0922   Post-Procedure Volume (cm^3) 4.752 cm^3 09/02/21 0922   Distance Tunneling (cm) 0 cm 09/02/21 0903   Tunneling Position ___ O'Clock 0 08/05/21 0912   Undermining Maxium Distance (cm) 0 09/02/21 0903   Wound Assessment Pink/red;Slough;Fibrin 09/02/21 0903   Drainage Amount Small 09/02/21 0903   Drainage Description Yellow 09/02/21 0903   Odor None 09/02/21 0903   Anaya-wound Assessment Intact 09/02/21 0903   Margins Defined edges 09/02/21 0903   Wound Thickness Description not for Pressure Injury Full thickness 09/02/21 0903   Number of days: 28          Percent of Wound Debrided: 100%    Total Surface Area Debrided:  15 sq cm    Diabetic/Pressure/Non Pressure Ulcers only:  Ulcer: Non-Pressure ulcer, fat layer exposed    Bleeding: Minimal    Hemostasis Achieved: by pressure    Procedural Pain: 0  / 10     Post Procedural Pain: 0 / 10     Response to treatment:  Well tolerated by patient. Will switch patient over to primary collagen dressing in view of preponderance of granulation tissue        Plan:     Treatment Note: Please see attached Discharge Instructions. These instructions were given and signed by the patient or POA    New Medication(s) at this visit:   New Prescriptions    No medications on file       Other orders at this visit:   Orders Placed This Encounter   Procedures   [address] [address] Shaw Protocol       Discharge Instructions          1909 [facility] Physician Orders and Discharge Instructions  [address] [address] [facility]. [facility]. [facility]  Telephone: [phone] hands with soap and water prior to and after every dressing change. Wound Cleansing:   · Do not scrub or use excessive force. · With each dressing change, rinse wounds with 0.9% Saline. (May use wound wash or soft contact solution. Both can be purchased at a local drug store). · If unable to obtain saline, may use a gentle soap and water. · Keep wounds dry in the shower unless otherwise instructed by the physician. · For wounds on lower legs, cast covers can be purchased at local drug stores, so that you may shower and keep the wound(s) dry. [] Instructions for Vashe Wash solution ONLY: Apply enough Vashe to soak a piece of gauze and place on wound bed for 5-10 minutes. DO NOT rinse after Vashe has been applied. Follow dressing application as instructed below.     Anaya wound Topical Treatments:  Do not apply lotions, creams, or ointments to the skin around the wound bed unless directed as followed:     [] Apply around the wound: [] moisturizing lotion [] Antifungal ointment [] No-Sting barrier film [] Zinc paste [] Other:       Dressings:           Wound Location: right lower leg      Apply Primary Dressing to wound:       Collagen      Cover and Secure with:     Cover and Secure with: 4X4 gauze pad  Bulky roll gauze   Avoid contact of tape with skin if possible.  When to change Dressing: [] Daily [] Every Other Day [] Once a week  [x] Three times per week: [] Monday, Wednesday, Friday [x] Tuesday, Thursday, Saturday  [] Do Not Change Dressing [] Other:       Edema Control:  Apply: [] Compression Stocking  [] Left Lower Leg [] Right Lower Leg     [x]  Spandagrip:Strength: High compression  20-30 mm/Hg     [x] Left Lower Leg  [x] Right Lower Leg        Apply every morning immediately when getting up. They should be applied to affected leg(s) from mid foot to knee making sure to cover the heel. Remove every night before going to bed. [x] Elevate leg(s) above the level of the heart for 30 minutes 4-5 times a day and/or when sitting. [x] Avoid prolonged standing in one place. Dietary:  Important dietary reminders:  1. Increase Protein intake (i.e. Lean meats, fish, eggs, legumes, and yogurt)  2. No added salt  3. If diabetic, follow a diabetic diet and check glucose prior to meals or as instructed by your physician. If you are still having pain after you go home:   For wounds on lower legs or arms, elevate the affected limb.  Use over-the-counter medications you would normally use for pain as permitted by your primary care doctor.  For persistent pain not relieved by the above interventions, please call your primary care doctor. Return Appointment:   Return Appointment: With Dr. Linda Gore  in  [address] [place] [address])  o Scheduled weekly until (Date):      [] Return Appointment for a Wound Assessment with a nurse on:     o All other future appointments:  - No future appointments.     [x]DME/Wound Dressing Supplies ordered at this visit: []Yes []No  o Supplies Provided by:   o Please call them directly to reorder supplies when you run out.  o CONTINUE TO USE THE SUPPLIES YOU HAVE AVAILABLE. IT IS MOST IMPORTANT TO KEEP THE WOUND COVERED AT ALL TIMES. [x] Orders placed during your visit:   Orders Placed This Encounter   Procedures    Initiate Outpatient Wound Care Protocol       Your nurse  is:  Denisa Lawrence     Electronically signed by Mirella Gimenez RN on 9/2/2021 at [address] [place] [address] Information: Should you experience any significant changes in your wound(s) or have questions about your wound care, please contact the [address] [place] [address] at [phone]. We are open from 8:00am - 4:30p Monday thru Friday except for Wednesdays which we are closed. Please give us 24-48 hours to return your call. Call your doctor now or seek immediate medical care if:    · You have symptoms of infection, such as:  ? Increased pain, swelling, warmth, or redness. ? Red streaks leading from the area. ? Pus draining from the area. ? A fever.         Physician for this visit and orders: Ruben Bloch, MD    [] Patient unable to sign Discharge Instructions given to ECF/Transportation/POA        Electronically signed by Ruben Bloch, MD on 9/2/2021 at 10:39 AM

## 2021-09-09 ENCOUNTER — HOSPITAL ENCOUNTER (OUTPATIENT)
Dept: WOUND CARE | Age: 76
Discharge: HOME | End: 2021-09-09
Payer: MEDICARE

## 2021-09-09 VITALS
TEMPERATURE: 97.1 F | SYSTOLIC BLOOD PRESSURE: 170 MMHG | HEART RATE: 57 BPM | DIASTOLIC BLOOD PRESSURE: 85 MMHG | RESPIRATION RATE: 16 BRPM

## 2021-09-09 DIAGNOSIS — T81.89XD NON-HEALING SURGICAL WOUND, SUBSEQUENT ENCOUNTER: Primary | ICD-10-CM

## 2021-09-09 DIAGNOSIS — S81.801A LEG WOUND, RIGHT, INITIAL ENCOUNTER: ICD-10-CM
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
## 2021-09-09 DIAGNOSIS — T81.89XA NON-HEALING SURGICAL WOUND, INITIAL ENCOUNTER: ICD-10-CM

## 2021-09-09 DIAGNOSIS — S81.801D LEG WOUND, RIGHT, SUBSEQUENT ENCOUNTER: ICD-10-CM

## 2021-09-09 PROCEDURE — 11042 DBRDMT SUBQ TIS 1ST 20SQCM/<: CPT | Performed by: SPECIALIST

## 2021-09-09 PROCEDURE — 11042 DBRDMT SUBQ TIS 1ST 20SQCM/<: CPT

## 2021-09-09 PROCEDURE — 6370000000 HC RX 637 (ALT 250 FOR IP): Performed by: SPECIALIST

## 2021-09-09 RX ORDER — LIDOCAINE HYDROCHLORIDE 20 MG/ML
JELLY TOPICAL ONCE
Status: CANCELLED | OUTPATIENT
Start: 2021-09-09 | End: 2021-09-09

## 2021-09-09 RX ORDER — BACITRACIN ZINC AND POLYMYXIN B SULFATE 500; 1000 [USP'U]/G; [USP'U]/G
OINTMENT TOPICAL ONCE
Status: CANCELLED | OUTPATIENT
Start: 2021-09-09 | End: 2021-09-09

## 2021-09-09 RX ORDER — GINSENG 100 MG
CAPSULE ORAL ONCE
Status: CANCELLED | OUTPATIENT
Start: 2021-09-09 | End: 2021-09-09

## 2021-09-09 RX ORDER — LIDOCAINE HYDROCHLORIDE 40 MG/ML
SOLUTION TOPICAL ONCE
Status: CANCELLED | OUTPATIENT
Start: 2021-09-09 | End: 2021-09-09

## 2021-09-09 RX ORDER — LIDOCAINE HYDROCHLORIDE 40 MG/ML
SOLUTION TOPICAL ONCE
Status: COMPLETED | OUTPATIENT
Start: 2021-09-09 | End: 2021-09-09

## 2021-09-09 RX ORDER — BETAMETHASONE DIPROPIONATE 0.05 %
OINTMENT (GRAM) TOPICAL ONCE
Status: CANCELLED | OUTPATIENT
Start: 2021-09-09 | End: 2021-09-09

## 2021-09-09 RX ORDER — GENTAMICIN SULFATE 1 MG/G
OINTMENT TOPICAL ONCE
Status: CANCELLED | OUTPATIENT
Start: 2021-09-09 | End: 2021-09-09

## 2021-09-09 RX ORDER — BACITRACIN, NEOMYCIN, POLYMYXIN B 400; 3.5; 5 [USP'U]/G; MG/G; [USP'U]/G
OINTMENT TOPICAL ONCE
Status: CANCELLED | OUTPATIENT
Start: 2021-09-09 | End: 2021-09-09

## 2021-09-09 RX ORDER — CLOBETASOL PROPIONATE 0.5 MG/G
OINTMENT TOPICAL ONCE
Status: CANCELLED | OUTPATIENT
Start: 2021-09-09 | End: 2021-09-09

## 2021-09-09 RX ORDER — LIDOCAINE 50 MG/G
OINTMENT TOPICAL ONCE
Status: CANCELLED | OUTPATIENT
Start: 2021-09-09 | End: 2021-09-09

## 2021-09-09 RX ORDER — LIDOCAINE 40 MG/G
CREAM TOPICAL ONCE
Status: CANCELLED | OUTPATIENT
Start: 2021-09-09 | End: 2021-09-09

## 2021-09-09 RX ADMIN — LIDOCAINE HYDROCHLORIDE: 40 SOLUTION TOPICAL at 09:07

## 2021-09-09 NOTE — PROGRESS NOTES
1227 [facility]  Progress Note and Procedure Note      Debbie Flanagan  MEDICAL RECORD NUMBER:  [id number]  AGE: 77 y.o. GENDER: male  : 1945  EPISODE DATE:  2021    Subjective:     Chief Complaint   Patient presents with    Wound Check     right lower leg         HISTORY of PRESENT ILLNESS HPI     Debbie Flanagan is a 77 y.o. male who presents today for wound/ulcer evaluation. History of Wound Context: This patient was referred from the emergency room after having sustained a dog bite to the right lower extremity several weeks ago. [facility] was initially seen in the emergency room the wound was primarily closed.  Subsequently seen 10 days later for further evaluation.  At that time it was decided he should be seen in wound care.  It should be noted ER provider stated there was a great deal of tension on the wound with  nylon sutures still in place    Pain Assessment:  Wound/Ulcer Pain Timing/Severity: none  Quality of pain: N/A  Severity:  0 / 10   Modifying Factors: None  Associated Signs/Symptoms: none    Ulcer Identification:  Ulcer Type: non-healing surgical and traumatic  Contributing Factors: none    Objective:      BP (!) 170/85   Pulse 57   Temp 97.1 °F (36.2 °C) (Temporal)   Resp 16     Wt Readings from Last 3 Encounters:   21 125 lb 10.6 oz (57 kg)   21 125 lb 10.6 oz (57 kg)   21 130 lb (59 kg)       PHYSICAL EXAM    Extremities: no cyanosis, clubbing or edema and full-thickness wound with fibrin slough and granulation tissue, epithelialization at margins right knee see measurements below    Assessment:      1. Non-healing surgical wound, subsequent encounter    2. Leg wound, right, subsequent encounter    3. Leg wound, right, initial encounter    4. Non-healing surgical wound, initial encounter    5.  Dog bite, initial encounter         Procedure Note  Indications:  Based on my examination of this patient's wound(s) today, sharp excision is cm    Diabetic/Pressure/Non Pressure Ulcers only:  Ulcer: Non-Pressure ulcer, fat layer exposed    Bleeding: Minimal    Hemostasis Achieved: by pressure    Procedural Pain: 0  / 10     Post Procedural Pain: 0 / 10     Response to treatment:  Well tolerated by patient. Improvement in wound measurements        Plan:     Treatment Note: Please see attached Discharge Instructions. These instructions were given and signed by the patient or POA    New Medication(s) at this visit:   New Prescriptions    No medications on file       Other orders at this visit:   Orders Placed This Encounter   Procedures   [address] Protocol       Discharge Instructions          [address] [facility] Physician Orders and Discharge Instructions  [address] [address] [address]  Telephone: [phone] hands with soap and water prior to and after every dressing change. Wound Cleansing:   · Do not scrub or use excessive force. · With each dressing change, rinse wounds with 0.9% Saline. (May use wound wash or soft contact solution. Both can be purchased at a local drug store). · If unable to obtain saline, may use a gentle soap and water. · Keep wounds dry in the shower unless otherwise instructed by the physician. · For wounds on lower legs, cast covers can be purchased at local drug stores, so that you may shower and keep the wound(s) dry. [x] Instructions for Vashe Wash solution ONLY: Apply enough Vashe to soak a piece of gauze and place on wound bed for 5-10 minutes. DO NOT rinse after Vashe has been applied. Follow dressing application as instructed below.     Anaya wound Topical Treatments:  Do not apply lotions, creams, or ointments to the skin around the wound bed unless directed as followed:     [] Apply around the wound: [] moisturizing lotion [] Antifungal ointment [] No-Sting barrier film [] Zinc paste [] Other:       Dressings:           Wound Location: right lower leg wound      Apply Primary Dressing to wound:       Collagen      Cover and Secure with:     Cover and Secure with: 4X4 gauze pad  Bulky roll gauze   Avoid contact of tape with skin if possible.  When to change Dressing: [] Daily [] Every Other Day [] Once a week  [x] Three times per week: [] Monday, Wednesday, Friday [x] Tuesday, Thursday, Saturday  [] Do Not Change Dressing [] Other:       Edema Control:  Apply: [] Compression Stocking  [] Left Lower Leg [] Right Lower Leg     [x]  Spandagrip:Strength: High compression  20-30 mm/Hg     [] Left Lower Leg  [x] Right Lower Leg        Apply every morning immediately when getting up. They should be applied to affected leg(s) from mid foot to knee making sure to cover the heel. Remove every night before going to bed. [x] Elevate leg(s) above the level of the heart for 30 minutes 4-5 times a day and/or when sitting. [x] Avoid prolonged standing in one place. Dietary:  Important dietary reminders:  1. Increase Protein intake (i.e. Lean meats, fish, eggs, legumes, and yogurt)  2. No added salt  3. If diabetic, follow a diabetic diet and check glucose prior to meals or as instructed by your physician. Dietary Supplements:  [] Bernardo  [] 30ml ProStat  [] EnsureEnlive [] Ensure Max/Premier  [] Other:    If you are still having pain after you go home:   For wounds on lower legs or arms, elevate the affected limb.  Use over-the-counter medications you would normally use for pain as permitted by your primary care doctor.  For persistent pain not relieved by the above interventions, please call your primary care doctor. Return Appointment:   Return Appointment: With Dr. Jazzmine Chicas  in  [address] [place] [address])  o Scheduled weekly until (Date):      [] Return Appointment for a Wound Assessment with a nurse on:     o All other future appointments:  - No future appointments.     [x]DME/Wound Dressing Supplies ordered at this visit: []Yes [x]No  o Supplies Provided by:   o Please call them directly to reorder supplies when you run out.  o CONTINUE TO USE THE SUPPLIES YOU HAVE AVAILABLE. IT IS MOST IMPORTANT TO KEEP THE WOUND COVERED AT ALL TIMES. [x] Orders placed during your visit:   Orders Placed This Encounter   Procedures    Initiate Outpatient Wound Care Protocol       Your nurse  is:  Michael Hussein     Electronically signed by Dorinda Sosa RN on 9/9/2021 at 9:38 AM     [address] [place] [address] Information: Should you experience any significant changes in your wound(s) or have questions about your wound care, please contact the [address] [place] [address] at [phone]. We are open from 8:00am - 4:30p Monday thru Friday except for Wednesdays which we are closed. Please give us 24-48 hours to return your call. Call your doctor now or seek immediate medical care if:    · You have symptoms of infection, such as:  ? Increased pain, swelling, warmth, or redness. ? Red streaks leading from the area. ? Pus draining from the area. ? A fever.         Physician for this visit and orders: Ronen Meehan MD    [] Patient unable to sign Discharge Instructions given to ECF/Transportation/POA        Electronically signed by Ronen Meehan MD on 9/9/2021 at 10:36 AM

## 2021-09-16 ENCOUNTER — HOSPITAL ENCOUNTER (OUTPATIENT)
Dept: WOUND CARE | Age: 76
Discharge: HOME | End: 2021-09-16
Payer: MEDICARE

## 2021-09-16 VITALS
SYSTOLIC BLOOD PRESSURE: 173 MMHG | TEMPERATURE: 97.6 F | RESPIRATION RATE: 16 BRPM | DIASTOLIC BLOOD PRESSURE: 76 MMHG | HEART RATE: 61 BPM

## 2021-09-16 DIAGNOSIS — T81.89XA NON-HEALING SURGICAL WOUND, INITIAL ENCOUNTER: ICD-10-CM

## 2021-09-16 DIAGNOSIS — S81.801D LEG WOUND, RIGHT, SUBSEQUENT ENCOUNTER: ICD-10-CM
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
## 2021-09-16 DIAGNOSIS — S81.801A LEG WOUND, RIGHT, INITIAL ENCOUNTER: ICD-10-CM

## 2021-09-16 DIAGNOSIS — T81.89XD NON-HEALING SURGICAL WOUND, SUBSEQUENT ENCOUNTER: Primary | ICD-10-CM

## 2021-09-16 PROCEDURE — 11042 DBRDMT SUBQ TIS 1ST 20SQCM/<: CPT

## 2021-09-16 PROCEDURE — 6370000000 HC RX 637 (ALT 250 FOR IP): Performed by: SPECIALIST

## 2021-09-16 PROCEDURE — 11042 DBRDMT SUBQ TIS 1ST 20SQCM/<: CPT | Performed by: SPECIALIST

## 2021-09-16 RX ORDER — LIDOCAINE HYDROCHLORIDE 20 MG/ML
JELLY TOPICAL ONCE
Status: CANCELLED | OUTPATIENT
Start: 2021-09-16 | End: 2021-09-16

## 2021-09-16 RX ORDER — BACITRACIN, NEOMYCIN, POLYMYXIN B 400; 3.5; 5 [USP'U]/G; MG/G; [USP'U]/G
OINTMENT TOPICAL ONCE
Status: CANCELLED | OUTPATIENT
Start: 2021-09-16 | End: 2021-09-16

## 2021-09-16 RX ORDER — LIDOCAINE 40 MG/G
CREAM TOPICAL ONCE
Status: CANCELLED | OUTPATIENT
Start: 2021-09-16 | End: 2021-09-16

## 2021-09-16 RX ORDER — BETAMETHASONE DIPROPIONATE 0.05 %
OINTMENT (GRAM) TOPICAL ONCE
Status: CANCELLED | OUTPATIENT
Start: 2021-09-16 | End: 2021-09-16

## 2021-09-16 RX ORDER — BACITRACIN ZINC AND POLYMYXIN B SULFATE 500; 1000 [USP'U]/G; [USP'U]/G
OINTMENT TOPICAL ONCE
Status: CANCELLED | OUTPATIENT
Start: 2021-09-16 | End: 2021-09-16

## 2021-09-16 RX ORDER — CLOBETASOL PROPIONATE 0.5 MG/G
OINTMENT TOPICAL ONCE
Status: CANCELLED | OUTPATIENT
Start: 2021-09-16 | End: 2021-09-16

## 2021-09-16 RX ORDER — LIDOCAINE 50 MG/G
OINTMENT TOPICAL ONCE
Status: CANCELLED | OUTPATIENT
Start: 2021-09-16 | End: 2021-09-16

## 2021-09-16 RX ORDER — LIDOCAINE HYDROCHLORIDE 40 MG/ML
SOLUTION TOPICAL ONCE
Status: COMPLETED | OUTPATIENT
Start: 2021-09-16 | End: 2021-09-16

## 2021-09-16 RX ORDER — LIDOCAINE HYDROCHLORIDE 40 MG/ML
SOLUTION TOPICAL ONCE
Status: CANCELLED | OUTPATIENT
Start: 2021-09-16 | End: 2021-09-16

## 2021-09-16 RX ORDER — GENTAMICIN SULFATE 1 MG/G
OINTMENT TOPICAL ONCE
Status: CANCELLED | OUTPATIENT
Start: 2021-09-16 | End: 2021-09-16

## 2021-09-16 RX ORDER — GINSENG 100 MG
CAPSULE ORAL ONCE
Status: CANCELLED | OUTPATIENT
Start: 2021-09-16 | End: 2021-09-16

## 2021-09-16 RX ADMIN — LIDOCAINE HYDROCHLORIDE: 40 SOLUTION TOPICAL at 09:19

## 2021-09-16 NOTE — PROGRESS NOTES
1227 [facility]  Progress Note and Procedure Note      Franky Chong  MEDICAL RECORD NUMBER:  [id number]  AGE: 77 y.o. GENDER: male  : 1945  EPISODE DATE:  2021    Subjective:     Chief Complaint   Patient presents with    Wound Check     right lower leg         HISTORY of PRESENT ILLNESS HPI     Franky Chogn is a 77 y.o. male who presents today for wound/ulcer evaluation. History of Wound Context: This patient was referred from the emergency room after having sustained a dog bite to the right lower extremity now 6 weeks ago. Juan Pablo Lander was initially seen in the emergency room the wound was primarily closed.  Subsequently seen 10 days later for further evaluation.  At that time it was decided he should be seen in wound care.  It should be noted ER provider stated there was a great deal of tension on the wound with  nylon sutures still in place    Pain Assessment:  Wound/Ulcer Pain Timing/Severity: none  Quality of pain: N/A  Severity:  0 / 10   Modifying Factors: None  Associated Signs/Symptoms: none    Ulcer Identification:  Ulcer Type: non-healing surgical and traumatic  Contributing Factors: none    Objective:      BP (!) 173/76   Pulse 61   Temp 97.6 °F (36.4 °C) (Temporal)   Resp 16     Wt Readings from Last 3 Encounters:   21 125 lb 10.6 oz (57 kg)   21 125 lb 10.6 oz (57 kg)   21 130 lb (59 kg)       PHYSICAL EXAM    Extremities: no cyanosis, clubbing or edema and full-thickness wound with fibrin, slough, granulation tissue, minimal epithelialization at the margins right knee    Assessment:      1. Non-healing surgical wound, subsequent encounter    2. Leg wound, right, subsequent encounter    3. Leg wound, right, initial encounter    4. Non-healing surgical wound, initial encounter    5.  Dog bite, initial encounter         Procedure Note  Indications:  Based on my examination of this patient's wound(s) today, sharp excision is required to promote healing and evaluate the extent healing. Performed by: Rupali Thomas MD    Consent obtained? Yes    Time out taken: Yes    Pain Control: Anesthetic: 4% Lidocaine Liquid Topical     Debridement:Excisional Debridement    Using curette the wound was sharply debrided    down through and including the removal of  epidermis, dermis and subcutaneous tissue. Devitalized Tissue Debrided:  fibrin and slough      Pre Debridement Measurements:  Are located in the Wound Documentation Flow Sheet   Wound #: 1     Post  Debridement Measurements:  Wound 08/05/21 Leg Lateral;Right; Lower #1 (Active)   Wound Image   09/02/21 0903   Wound Etiology Traumatic 08/05/21 0912   Wound Cleansed Cleansed with saline 09/16/21 0911   Dressing/Treatment Collagen 09/16/21 0932   Wound Length (cm) 4.1 cm 09/16/21 0911   Wound Width (cm) 2.5 cm 09/16/21 0911   Wound Depth (cm) 0.1 cm 09/16/21 0911   Wound Surface Area (cm^2) 10.25 cm^2 09/16/21 0911   Change in Wound Size % (l*w) 31.67 09/16/21 0911   Wound Volume (cm^3) 1.025 cm^3 09/16/21 0911   Wound Healing % 32 09/16/21 0911   Post-Procedure Length (cm) 4.2 cm 09/16/21 0932   Post-Procedure Width (cm) 2.6 cm 09/16/21 0932   Post-Procedure Depth (cm) 0.3 cm 09/16/21 0932   Post-Procedure Surface Area (cm^2) 10.92 cm^2 09/16/21 0932   Post-Procedure Volume (cm^3) 3.276 cm^3 09/16/21 0932   Distance Tunneling (cm) 0 cm 09/16/21 0911   Tunneling Position ___ O'Clock 0 08/05/21 0912   Undermining Maxium Distance (cm) 0 09/16/21 0911   Wound Assessment Hyper granulation tissue;Pink/red;Fibrin 09/16/21 0911   Drainage Amount Small 09/16/21 0911   Drainage Description Brown 09/16/21 0911   Odor None 09/16/21 0911   Anaya-wound Assessment Intact 09/16/21 0911   Margins Defined edges 09/16/21 0911   Wound Thickness Description not for Pressure Injury Full thickness 09/16/21 0911   Number of days: 42          Percent of Wound Debrided: 100%    Total Surface Area Debrided:  10.9 sq interventions, please call your primary care doctor. Return Appointment:   Return Appointment: With Dr. Craft  in  1 [facility])  o Scheduled weekly until (Date):      [] Return Appointment for a Wound Assessment with a nurse on:     o All other future appointments:  - No future appointments. [x]DME/Wound Dressing Supplies ordered at this visit: []Yes []No  o Supplies Provided by:   o Please call them directly to reorder supplies when you run out.  o CONTINUE TO USE THE SUPPLIES YOU HAVE AVAILABLE. IT IS MOST IMPORTANT TO KEEP THE WOUND COVERED AT ALL TIMES. [x] Orders placed during your visit:   Orders Placed This Encounter   Procedures    Initiate Outpatient Wound Care Protocol       Your nurse  is:  Sharon Leyva     Electronically signed by Danica Wells RN on 9/16/2021 at 9:33 AM     [address] Information: Should you experience any significant changes in your wound(s) or have questions about your wound care, please contact the [address] [place] [address] at [phone]. We are open from 8:00am - 4:30p Monday thru Friday except for Wednesdays which we are closed. Please give us 24-48 hours to return your call. Call your doctor now or seek immediate medical care if:    · You have symptoms of infection, such as:  ? Increased pain, swelling, warmth, or redness. ? Red streaks leading from the area. ? Pus draining from the area. ? A fever.         Physician for this visit and orders: Koffi Verma MD    [] Patient unable to sign Discharge Instructions given to ECF/Transportation/POA        Electronically signed by Koffi Verma MD on 9/16/2021 at 11:42 AM

## 2021-09-23 ENCOUNTER — HOSPITAL ENCOUNTER (OUTPATIENT)
Dept: WOUND CARE | Age: 76
Discharge: HOME | End: 2021-09-23
Payer: MEDICARE

## 2021-09-23 VITALS
HEART RATE: 64 BPM | SYSTOLIC BLOOD PRESSURE: 163 MMHG | RESPIRATION RATE: 16 BRPM | DIASTOLIC BLOOD PRESSURE: 77 MMHG | TEMPERATURE: 97.5 F

## 2021-09-23 DIAGNOSIS — S81.801D LEG WOUND, RIGHT, SUBSEQUENT ENCOUNTER: ICD-10-CM

## 2021-09-23 DIAGNOSIS — T81.89XA NON-HEALING SURGICAL WOUND, INITIAL ENCOUNTER: ICD-10-CM
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
## 2021-09-23 DIAGNOSIS — S81.801A LEG WOUND, RIGHT, INITIAL ENCOUNTER: ICD-10-CM

## 2021-09-23 DIAGNOSIS — T81.89XD NON-HEALING SURGICAL WOUND, SUBSEQUENT ENCOUNTER: Primary | ICD-10-CM
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
## 2021-09-23 PROCEDURE — 11042 DBRDMT SUBQ TIS 1ST 20SQCM/<: CPT | Performed by: SPECIALIST

## 2021-09-23 PROCEDURE — 6370000000 HC RX 637 (ALT 250 FOR IP): Performed by: SPECIALIST

## 2021-09-23 PROCEDURE — 11042 DBRDMT SUBQ TIS 1ST 20SQCM/<: CPT

## 2021-09-23 RX ORDER — BACITRACIN, NEOMYCIN, POLYMYXIN B 400; 3.5; 5 [USP'U]/G; MG/G; [USP'U]/G
OINTMENT TOPICAL ONCE
Status: CANCELLED | OUTPATIENT
Start: 2021-09-23 | End: 2021-09-23

## 2021-09-23 RX ORDER — BETAMETHASONE DIPROPIONATE 0.05 %
OINTMENT (GRAM) TOPICAL ONCE
Status: CANCELLED | OUTPATIENT
Start: 2021-09-23 | End: 2021-09-23

## 2021-09-23 RX ORDER — GENTAMICIN SULFATE 1 MG/G
OINTMENT TOPICAL ONCE
Status: CANCELLED | OUTPATIENT
Start: 2021-09-23 | End: 2021-09-23

## 2021-09-23 RX ORDER — BACITRACIN ZINC AND POLYMYXIN B SULFATE 500; 1000 [USP'U]/G; [USP'U]/G
OINTMENT TOPICAL ONCE
Status: CANCELLED | OUTPATIENT
Start: 2021-09-23 | End: 2021-09-23

## 2021-09-23 RX ORDER — CLOBETASOL PROPIONATE 0.5 MG/G
OINTMENT TOPICAL ONCE
Status: CANCELLED | OUTPATIENT
Start: 2021-09-23 | End: 2021-09-23

## 2021-09-23 RX ORDER — GINSENG 100 MG
CAPSULE ORAL ONCE
Status: CANCELLED | OUTPATIENT
Start: 2021-09-23 | End: 2021-09-23

## 2021-09-23 RX ORDER — LIDOCAINE HYDROCHLORIDE 20 MG/ML
JELLY TOPICAL ONCE
Status: CANCELLED | OUTPATIENT
Start: 2021-09-23 | End: 2021-09-23

## 2021-09-23 RX ORDER — LIDOCAINE 50 MG/G
OINTMENT TOPICAL ONCE
Status: CANCELLED | OUTPATIENT
Start: 2021-09-23 | End: 2021-09-23

## 2021-09-23 RX ORDER — LIDOCAINE HYDROCHLORIDE 40 MG/ML
SOLUTION TOPICAL ONCE
Status: COMPLETED | OUTPATIENT
Start: 2021-09-23 | End: 2021-09-23

## 2021-09-23 RX ORDER — LIDOCAINE 40 MG/G
CREAM TOPICAL ONCE
Status: CANCELLED | OUTPATIENT
Start: 2021-09-23 | End: 2021-09-23

## 2021-09-23 RX ORDER — LIDOCAINE HYDROCHLORIDE 40 MG/ML
SOLUTION TOPICAL ONCE
Status: CANCELLED | OUTPATIENT
Start: 2021-09-23 | End: 2021-09-23

## 2021-09-23 RX ADMIN — LIDOCAINE HYDROCHLORIDE: 40 SOLUTION TOPICAL at 08:50

## 2021-09-23 NOTE — PROGRESS NOTES
[address] [facility] - [place], [place]  Progress Note and Procedure Note      Fransisco Raphael  MEDICAL RECORD NUMBER:  [id number]  AGE: 77 y.o. GENDER: male  : 1945  EPISODE DATE:  2021    Subjective:     Chief Complaint   Patient presents with    Wound Check     right leg         HISTORY of PRESENT ILLNESS HPI     Fransisco Raphael is a 77 y.o. male who presents today for wound/ulcer evaluation. History of Wound Context: Patient returns for continued treatment for dog bite wound with dehiscence of surgical closure. He has been changing the dressing with collagen and states he is doing well    Pain Assessment:  Wound/Ulcer Pain Timing/Severity: none  Quality of pain: N/A  Severity:  0 / 10   Modifying Factors: None  Associated Signs/Symptoms: none    Ulcer Identification:  Ulcer Type: non-healing surgical and traumatic  Contributing Factors: none    Objective:      BP (!) 163/77   Pulse 64   Temp 97.5 °F (36.4 °C) (Temporal)   Resp 16     Wt Readings from Last 3 Encounters:   21 125 lb 10.6 oz (57 kg)   21 125 lb 10.6 oz (57 kg)   21 130 lb (59 kg)       PHYSICAL EXAM    Extremities: no cyanosis, clubbing or edema and granulating wound with minimal fibrin, epithelialization at the margins of the wound right knee. See new measurements below    Assessment:      1. Non-healing surgical wound, subsequent encounter    2. Dog bite, subsequent encounter    3. Leg wound, right, subsequent encounter    4. Leg wound, right, initial encounter    5. Non-healing surgical wound, initial encounter    6. Dog bite, initial encounter         Procedure Note  Indications:  Based on my examination of this patient's wound(s) today, sharp excision is required to promote healing and evaluate the extent healing. Performed by: Vishnu Tompkins MD    Consent obtained?  Yes    Time out taken: Yes    Pain Control: Anesthetic: 4% Lidocaine Liquid Topical     Debridement:Excisional Debridement    Using curette the wound was sharply debrided    down through and including the removal of  epidermis, dermis and subcutaneous tissue. Devitalized Tissue Debrided:  fibrin      Pre Debridement Measurements:  Are located in the Wound Documentation Flow Sheet   Wound #: 1     Post  Debridement Measurements:  Wound 08/05/21 Leg Lateral;Right; Lower #1 (Active)   Wound Image   09/02/21 0903   Wound Etiology Traumatic 08/05/21 0912   Wound Cleansed Cleansed with saline 09/23/21 0848   Dressing/Treatment Collagen 09/23/21 0903   Wound Length (cm) 3.8 cm 09/23/21 0848   Wound Width (cm) 1.5 cm 09/23/21 0848   Wound Depth (cm) 0.1 cm 09/23/21 0848   Wound Surface Area (cm^2) 5.7 cm^2 09/23/21 0848   Change in Wound Size % (l*w) 62 09/23/21 0848   Wound Volume (cm^3) 0.57 cm^3 09/23/21 0848   Wound Healing % 62 09/23/21 0848   Post-Procedure Length (cm) 3.9 cm 09/23/21 0903   Post-Procedure Width (cm) 1.6 cm 09/23/21 0903   Post-Procedure Depth (cm) 0.3 cm 09/23/21 0903   Post-Procedure Surface Area (cm^2) 6.24 cm^2 09/23/21 0903   Post-Procedure Volume (cm^3) 1.872 cm^3 09/23/21 0903   Distance Tunneling (cm) 0 cm 09/23/21 0848   Tunneling Position ___ O'Clock 0 08/05/21 0912   Undermining Maxium Distance (cm) 0 09/23/21 0848   Wound Assessment Hyper granulation tissue;Pink/red;Fibrin 09/23/21 0848   Drainage Amount Small 09/23/21 0848   Drainage Description Brown 09/23/21 0848   Odor None 09/23/21 0848   Anyaa-wound Assessment Intact 09/23/21 0848   Margins Defined edges 09/23/21 0848   Wound Thickness Description not for Pressure Injury Full thickness 09/23/21 0848   Number of days: 49          Percent of Wound Debrided: 100%    Total Surface Area Debrided:  6.2 sq cm    Diabetic/Pressure/Non Pressure Ulcers only:  Ulcer: Non-Pressure ulcer, fat layer exposed    Bleeding: Minimal    Hemostasis Achieved: by pressure    Procedural Pain: 0  / 10     Post Procedural Pain: 0 / 10     Response to treatment:  Well tolerated by patient. Significant improvement in wound measurements. Plan:     Treatment Note: Please see attached Discharge Instructions. These instructions were given and signed by the patient or POA    New Medication(s) at this visit:   New Prescriptions    No medications on file       Other orders at this visit:   Orders Placed This Encounter   Procedures   [address] [address] [facility] Protocol       Discharge Instructions          [address] [facility] Physician Orders and Discharge Instructions  [address] [address] [address]  Telephone: [phone]  NAME:  Babatunde Vicente  YOB: 1945  MEDICAL RECORD NUMBER:  [id number]  DATE:  9/23/2021    Wash hands with soap and water prior to and after every dressing change. Wound Cleansing:   · Do not scrub or use excessive force. · With each dressing change, rinse wounds with 0.9% Saline. (May use wound wash or soft contact solution. Both can be purchased at a local drug store). · If unable to obtain saline, may use a gentle soap and water. · Keep wounds dry in the shower unless otherwise instructed by the physician. · For wounds on lower legs, cast covers can be purchased at local drug stores, so that you may shower and keep the wound(s) dry. [x] Instructions for Vashe Wash solution ONLY: Apply enough Vashe to soak a piece of gauze and place on wound bed for 5-10 minutes. DO NOT rinse after Vashe has been applied. Follow dressing application as instructed below.     Anaya wound Topical Treatments:  Do not apply lotions, creams, or ointments to the skin around the wound bed unless directed as followed:     [] Apply around the wound: [] moisturizing lotion [] Antifungal ointment [] No-Sting barrier film [] Zinc paste [] Other:       Dressings:           Wound Location: right lower leg wound      Apply Primary Dressing to wound:       Collagen      Cover and Secure with: Cover and Secure with: 2X2 gauze pad  Bulky roll gauze   Avoid contact of tape with skin if possible.  When to change Dressing: [] Daily [] Every Other Day [] Once a week  [x] Three times per week: [] Monday, Wednesday, Friday [x] Tuesday, Thursday, Saturday  [] Do Not Change Dressing [] Other:     Edema Control:  Apply: [] Compression Stocking  [] Left Lower Leg [] Right Lower Leg     [x]  Spandagrip:Strength: High compression  20-30 mm/Hg     [] Left Lower Leg  [x] Right Lower Leg        Apply every morning immediately when getting up. They should be applied to affected leg(s) from mid foot to knee making sure to cover the heel. Remove every night before going to bed. [x] Elevate leg(s) above the level of the heart for 30 minutes 4-5 times a day and/or when sitting. [x] Avoid prolonged standing in one place. [x] Avoid prolonged standing in one place. Dietary:  Important dietary reminders:  1. Increase Protein intake (i.e. Lean meats, fish, eggs, legumes, and yogurt)  2. No added salt  3. If diabetic, follow a diabetic diet and check glucose prior to meals or as instructed by your physician. Dietary Supplements:  [] Bernardo  [] 30ml ProStat  [] EnsureEnlive [] Ensure Max/Premier  [] Other:    If you are still having pain after you go home:   For wounds on lower legs or arms, elevate the affected limb.  Use over-the-counter medications you would normally use for pain as permitted by your primary care doctor.  For persistent pain not relieved by the above interventions, please call your primary care doctor.      Return Appointment:   Return Appointment: With Dr. Kaitlynn Garcia  in  [address] [place] [address])  o Scheduled weekly until (Date):      [] Return Appointment for a Wound Assessment with a nurse on:     o All other future appointments:  Future Appointments   Date Time Provider Ollie Chamorro   9/30/2021  8:45 AM Selena Reich  [address]    -     [x]DME/Wound Dressing Supplies ordered at this visit: []Yes []No  o Supplies Provided by:   o Please call them directly to reorder supplies when you run out.  o CONTINUE TO USE THE SUPPLIES YOU HAVE AVAILABLE. IT IS MOST IMPORTANT TO KEEP THE WOUND COVERED AT ALL TIMES. [x] Orders placed during your visit:   Orders Placed This Encounter   Procedures    Initiate Outpatient Wound Care Protocol       Your nurse  is:  Reshma Sanchez     Electronically signed by Martah Gutierrez RN on 9/23/2021 at 9:04 R [address] 8 Information: Should you experience any significant changes in your wound(s) or have questions about your wound care, please contact the [address] [place] [address] at [phone]. We are open from 8:00am - 4:30p Monday thru Friday except for Wednesdays which we are closed. Please give us 24-48 hours to return your call. Call your doctor now or seek immediate medical care if:    · You have symptoms of infection, such as:  ? Increased pain, swelling, warmth, or redness. ? Red streaks leading from the area. ? Pus draining from the area. ? A fever.         Physician for this visit and orders: Elvia Curling, MD    [] Patient unable to sign Discharge Instructions given to ECF/Transportation/POA        Electronically signed by Elvia Curling, MD on 9/23/2021 at 9:17 AM

## 2021-10-07 ENCOUNTER — HOSPITAL ENCOUNTER (OUTPATIENT)
Dept: WOUND CARE | Age: 76
Discharge: HOME | End: 2021-10-07
Payer: MEDICARE

## 2021-10-07 VITALS
BODY MASS INDEX: 18.38 KG/M2 | HEART RATE: 63 BPM | SYSTOLIC BLOOD PRESSURE: 176 MMHG | WEIGHT: 124.12 LBS | RESPIRATION RATE: 16 BRPM | TEMPERATURE: 97.1 F | HEIGHT: 69 IN | DIASTOLIC BLOOD PRESSURE: 81 MMHG

## 2021-10-07 DIAGNOSIS — T81.89XA NON-HEALING SURGICAL WOUND, INITIAL ENCOUNTER: ICD-10-CM
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
## 2021-10-07 DIAGNOSIS — S81.801D LEG WOUND, RIGHT, SUBSEQUENT ENCOUNTER: Primary | ICD-10-CM

## 2021-10-07 DIAGNOSIS — S81.801A LEG WOUND, RIGHT, INITIAL ENCOUNTER: ICD-10-CM

## 2021-10-07 PROCEDURE — 11042 DBRDMT SUBQ TIS 1ST 20SQCM/<: CPT | Performed by: SPECIALIST

## 2021-10-07 PROCEDURE — 11042 DBRDMT SUBQ TIS 1ST 20SQCM/<: CPT

## 2021-10-07 PROCEDURE — 6370000000 HC RX 637 (ALT 250 FOR IP): Performed by: SPECIALIST

## 2021-10-07 RX ORDER — LIDOCAINE HYDROCHLORIDE 40 MG/ML
SOLUTION TOPICAL ONCE
Status: CANCELLED | OUTPATIENT
Start: 2021-10-07 | End: 2021-10-07

## 2021-10-07 RX ORDER — LIDOCAINE HYDROCHLORIDE 40 MG/ML
SOLUTION TOPICAL ONCE
Status: COMPLETED | OUTPATIENT
Start: 2021-10-07 | End: 2021-10-07

## 2021-10-07 RX ORDER — GINSENG 100 MG
CAPSULE ORAL ONCE
Status: CANCELLED | OUTPATIENT
Start: 2021-10-07 | End: 2021-10-07

## 2021-10-07 RX ORDER — LIDOCAINE 40 MG/G
CREAM TOPICAL ONCE
Status: CANCELLED | OUTPATIENT
Start: 2021-10-07 | End: 2021-10-07

## 2021-10-07 RX ORDER — BACITRACIN, NEOMYCIN, POLYMYXIN B 400; 3.5; 5 [USP'U]/G; MG/G; [USP'U]/G
OINTMENT TOPICAL ONCE
Status: CANCELLED | OUTPATIENT
Start: 2021-10-07 | End: 2021-10-07

## 2021-10-07 RX ORDER — LIDOCAINE 50 MG/G
OINTMENT TOPICAL ONCE
Status: CANCELLED | OUTPATIENT
Start: 2021-10-07 | End: 2021-10-07

## 2021-10-07 RX ORDER — CLOBETASOL PROPIONATE 0.5 MG/G
OINTMENT TOPICAL ONCE
Status: CANCELLED | OUTPATIENT
Start: 2021-10-07 | End: 2021-10-07

## 2021-10-07 RX ORDER — BETAMETHASONE DIPROPIONATE 0.05 %
OINTMENT (GRAM) TOPICAL ONCE
Status: CANCELLED | OUTPATIENT
Start: 2021-10-07 | End: 2021-10-07

## 2021-10-07 RX ORDER — GENTAMICIN SULFATE 1 MG/G
OINTMENT TOPICAL ONCE
Status: CANCELLED | OUTPATIENT
Start: 2021-10-07 | End: 2021-10-07

## 2021-10-07 RX ORDER — LIDOCAINE HYDROCHLORIDE 20 MG/ML
JELLY TOPICAL ONCE
Status: CANCELLED | OUTPATIENT
Start: 2021-10-07 | End: 2021-10-07

## 2021-10-07 RX ORDER — BACITRACIN ZINC AND POLYMYXIN B SULFATE 500; 1000 [USP'U]/G; [USP'U]/G
OINTMENT TOPICAL ONCE
Status: CANCELLED | OUTPATIENT
Start: 2021-10-07 | End: 2021-10-07

## 2021-10-07 RX ADMIN — LIDOCAINE HYDROCHLORIDE: 40 SOLUTION TOPICAL at 09:10

## 2021-10-07 NOTE — PROGRESS NOTES
[address] [facility] - [place], [place]  Progress Note and Procedure Note      Mihir Ferguson  MEDICAL RECORD NUMBER:  [id number]  AGE: 77 y.o. GENDER: male  : 1945  EPISODE DATE:  10/7/2021    Subjective:     Chief Complaint   Patient presents with    Wound Check     left lower leg         HISTORY of PRESENT ILLNESS HPI     Mihir Ferguson is a 77 y.o. male who presents today for wound/ulcer evaluation. History of Wound Context: Patient returns for continued treatment for dog bite wound with dehiscence of surgical closure. He has been changing the dressing with collagen and states he is doing well    Pain Assessment:  Wound/Ulcer Pain Timing/Severity: none  Quality of pain: N/A  Severity:  0 / 10   Modifying Factors: None  Associated Signs/Symptoms: none    Ulcer Identification:  Ulcer Type: non-healing surgical and traumatic  Contributing Factors: none    Objective:      BP (!) 176/81   Pulse 63   Temp 97.1 °F (36.2 °C) (Tympanic)   Resp 16   Ht 5' 9\" (1.753 m)   Wt 124 lb 1.9 oz (56.3 kg)   BMI 18.33 kg/m²     Wt Readings from Last 3 Encounters:   10/07/21 124 lb 1.9 oz (56.3 kg)   21 125 lb 10.6 oz (57 kg)   21 125 lb 10.6 oz (57 kg)       PHYSICAL EXAM    Extremities: no cyanosis, clubbing or edema and granulating wound minimal fibrin and slough, and marked epithelialization at the margins right lateral knee        Assessment:      1. Leg wound, right, subsequent encounter    2. Leg wound, right, initial encounter    3. Non-healing surgical wound, initial encounter    4. Dog bite, initial encounter         Procedure Note  Indications:  Based on my examination of this patient's wound(s) today, sharp excision is required to promote healing and evaluate the extent healing. Performed by: Davon Power MD    Consent obtained?  Yes    Time out taken: Yes    Pain Control: Anesthetic: 4% Lidocaine Liquid Topical     Debridement:Excisional Debridement    Using curette the wound was sharply debrided    down through and including the removal of  epidermis, dermis and subcutaneous tissue. Devitalized Tissue Debrided:  fibrin and slough      Pre Debridement Measurements:  Are located in the Wound Documentation Flow Sheet   Wound #: 1     Post  Debridement Measurements:  Wound 08/05/21 Leg Lateral;Right; Lower #1 (Active)   Wound Image   09/02/21 0903   Wound Etiology Traumatic 08/05/21 0912   Wound Cleansed Cleansed with saline 10/07/21 0904   Dressing/Treatment Collagen 10/07/21 0916   Wound Length (cm) 2.5 cm 10/07/21 0904   Wound Width (cm) 0.9 cm 10/07/21 0904   Wound Depth (cm) 0.1 cm 10/07/21 0904   Wound Surface Area (cm^2) 2.25 cm^2 10/07/21 0904   Change in Wound Size % (l*w) 85 10/07/21 0904   Wound Volume (cm^3) 0.225 cm^3 10/07/21 0904   Wound Healing % 85 10/07/21 0904   Post-Procedure Length (cm) 2.6 cm 10/07/21 0916   Post-Procedure Width (cm) 1.7 cm 10/07/21 0916   Post-Procedure Depth (cm) 0.3 cm 10/07/21 0916   Post-Procedure Surface Area (cm^2) 4.42 cm^2 10/07/21 0916   Post-Procedure Volume (cm^3) 1.326 cm^3 10/07/21 0916   Distance Tunneling (cm) 0 cm 09/23/21 0848   Tunneling Position ___ O'Clock 0 08/05/21 0912   Undermining Maxium Distance (cm) 0 09/23/21 0848   Wound Assessment Hyper granulation tissue;Pink/red;Fibrin 09/23/21 0848   Drainage Amount Small 10/07/21 0904   Drainage Description Serosanguinous 10/07/21 0904   Odor None 10/07/21 0904   Anaya-wound Assessment Intact 10/07/21 0904   Margins Defined edges 10/07/21 0904   Wound Thickness Description not for Pressure Injury Full thickness 09/23/21 0848   Number of days: 63          Percent of Wound Debrided: 100%    Total Surface Area Debrided:  4 sq cm    Diabetic/Pressure/Non Pressure Ulcers only:  Ulcer: Non-Pressure ulcer, fat layer exposed    Bleeding: Minimal    Hemostasis Achieved: by pressure    Procedural Pain: 0  / 10     Post Procedural Pain: 0 / 10     Response to treatment:  Well tolerated by patient. Marked improvement in wound measurements        Plan:     Treatment Note: Please see attached Discharge Instructions. These instructions were given and signed by the patient or POA    New Medication(s) at this visit:   New Prescriptions    No medications on file       Other orders at this visit:   Orders Placed This Encounter   Procedures   [address] [address] Protocol       Discharge Instructions          [facility] Physician Orders and Discharge Instructions  [address] [address] [address] [address] [facility]. [facility]. [address]  Telephone: [phone]  NAME:  Gil Stoddard  YOB: 1945  MEDICAL RECORD NUMBER:  [id number]  DATE:  10/7/2021    Wash hands with soap and water prior to and after every dressing change. Wound Cleansing:   · Do not scrub or use excessive force. · With each dressing change, rinse wounds with 0.9% Saline. (May use wound wash or soft contact solution. Both can be purchased at a local drug store). · If unable to obtain saline, may use a gentle soap and water. · Keep wounds dry in the shower unless otherwise instructed by the physician. · For wounds on lower legs, cast covers can be purchased at local drug stores, so that you may shower and keep the wound(s) dry. [] Instructions for Vashe Wash solution ONLY: Apply enough Vashe to soak a piece of gauze and place on wound bed for 5-10 minutes. DO NOT rinse after Vashe has been applied. Follow dressing application as instructed below.     Anaya wound Topical Treatments:  Do not apply lotions, creams, or ointments to the skin around the wound bed unless directed as followed:     [] Apply around the wound: [] moisturizing lotion [] Antifungal ointment [] No-Sting barrier film [] Zinc paste [] Other:       Dressings:           Wound Location: right lower leg      Apply Primary Dressing to wound:       Collagen      Cover and Secure with:     Cover and Secure with: 4X4 gauze pad  Bulky roll gauze   Avoid contact of tape with skin if possible.  When to change Dressing: [] Daily [] Every Other Day [] Once a week  [x] Three times per week: [] Monday, Wednesday, Friday [x] Tuesday, Thursday, Saturday  [] Do Not Change Dressing [] Other:           Edema Control:     []  Spandagrip:Strength: High compression  20-30 mm/Hg     [] Left Lower Leg  [x] Right Lower Leg        Apply every morning immediately when getting up. They should be applied to affected leg(s) from mid foot to knee making sure to cover the heel. Remove every night before going to bed. [x] Elevate leg(s) above the level of the heart for 30 minutes 4-5 times a day and/or when sitting. [x] Avoid prolonged standing in one place. Dietary:  Important dietary reminders:  1. Increase Protein intake (i.e. Lean meats, fish, eggs, legumes, and yogurt)  2. No added salt  3. If diabetic, follow a diabetic diet and check glucose prior to meals or as instructed by your physician. Dietary Supplements:  [] Bernardo  [] 30ml ProStat  [] EnsureEnlive [] Ensure Max/Premier  [] Other:    If you are still having pain after you go home:   For wounds on lower legs or arms, elevate the affected limb.  Use over-the-counter medications you would normally use for pain as permitted by your primary care doctor.  For persistent pain not relieved by the above interventions, please call your primary care doctor. Return Appointment:   Return Appointment: With Dr. Rohan Foss  in  [address] [place] [address])  o Scheduled weekly until (Date):      [] Return Appointment for a Wound Assessment with a nurse on:     o All other future appointments:  - No future appointments. [x]DME/Wound Dressing Supplies ordered at this visit: []Yes []No  o Supplies Provided by:   o Please call them directly to reorder supplies when you run out.  o CONTINUE TO USE THE SUPPLIES YOU HAVE AVAILABLE. IT IS MOST IMPORTANT TO KEEP THE WOUND COVERED AT ALL TIMES.     [x] Orders placed during your visit:   Orders Placed This Encounter   Procedures    Initiate Outpatient Wound Care Protocol       Your nurse  is: Rd Patten     Electronically signed by Latosha Lombardi RN on 10/7/2021 at 9:19 AM     [facility] Information: Should you experience any significant changes in your wound(s) or have questions about your wound care, please contact the [address] [place] [address] at [phone]. We are open from 8:00am - 4:30p Monday thru Friday except for Wednesdays which we are closed. Please give us 24-48 hours to return your call. Call your doctor now or seek immediate medical care if:    · You have symptoms of infection, such as:  ? Increased pain, swelling, warmth, or redness. ? Red streaks leading from the area. ? Pus draining from the area. ? A fever.         Physician for this visit and orders: Honey Ceja MD    [] Patient unable to sign Discharge Instructions given to ECF/Transportation/POA        Electronically signed by Honey Ceja MD on 10/7/2021 at 9:23 AM

## 2021-10-21 ENCOUNTER — HOSPITAL ENCOUNTER (OUTPATIENT)
Dept: WOUND CARE | Age: 76
Discharge: HOME | End: 2021-10-21
Payer: MEDICARE

## 2021-10-21 VITALS
TEMPERATURE: 97.5 F | DIASTOLIC BLOOD PRESSURE: 75 MMHG | SYSTOLIC BLOOD PRESSURE: 163 MMHG | HEART RATE: 63 BPM | RESPIRATION RATE: 16 BRPM

## 2021-10-21 DIAGNOSIS — S81.801A LEG WOUND, RIGHT, INITIAL ENCOUNTER: ICD-10-CM

## 2021-10-21 DIAGNOSIS — S81.801D LEG WOUND, RIGHT, SUBSEQUENT ENCOUNTER: ICD-10-CM

## 2021-10-21 DIAGNOSIS — T81.89XD NON-HEALING SURGICAL WOUND, SUBSEQUENT ENCOUNTER: Primary | ICD-10-CM

## 2021-10-21 DIAGNOSIS — T81.89XA NON-HEALING SURGICAL WOUND, INITIAL ENCOUNTER: ICD-10-CM
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
## 2021-10-21 PROCEDURE — 6370000000 HC RX 637 (ALT 250 FOR IP): Performed by: SPECIALIST

## 2021-10-21 PROCEDURE — 99212 OFFICE O/P EST SF 10 MIN: CPT | Performed by: SPECIALIST

## 2021-10-21 PROCEDURE — 99213 OFFICE O/P EST LOW 20 MIN: CPT

## 2021-10-21 RX ORDER — LIDOCAINE 40 MG/G
CREAM TOPICAL ONCE
Status: CANCELLED | OUTPATIENT
Start: 2021-10-21 | End: 2021-10-21

## 2021-10-21 RX ORDER — LIDOCAINE HYDROCHLORIDE 20 MG/ML
JELLY TOPICAL ONCE
Status: CANCELLED | OUTPATIENT
Start: 2021-10-21 | End: 2021-10-21

## 2021-10-21 RX ORDER — CLOBETASOL PROPIONATE 0.5 MG/G
OINTMENT TOPICAL ONCE
Status: CANCELLED | OUTPATIENT
Start: 2021-10-21 | End: 2021-10-21

## 2021-10-21 RX ORDER — LIDOCAINE HYDROCHLORIDE 40 MG/ML
SOLUTION TOPICAL ONCE
Status: COMPLETED | OUTPATIENT
Start: 2021-10-21 | End: 2021-10-21

## 2021-10-21 RX ORDER — LIDOCAINE HYDROCHLORIDE 40 MG/ML
SOLUTION TOPICAL ONCE
Status: CANCELLED | OUTPATIENT
Start: 2021-10-21 | End: 2021-10-21

## 2021-10-21 RX ORDER — GINSENG 100 MG
CAPSULE ORAL ONCE
Status: CANCELLED | OUTPATIENT
Start: 2021-10-21 | End: 2021-10-21

## 2021-10-21 RX ORDER — LIDOCAINE 50 MG/G
OINTMENT TOPICAL ONCE
Status: CANCELLED | OUTPATIENT
Start: 2021-10-21 | End: 2021-10-21

## 2021-10-21 RX ORDER — GENTAMICIN SULFATE 1 MG/G
OINTMENT TOPICAL ONCE
Status: CANCELLED | OUTPATIENT
Start: 2021-10-21 | End: 2021-10-21

## 2021-10-21 RX ORDER — BACITRACIN ZINC AND POLYMYXIN B SULFATE 500; 1000 [USP'U]/G; [USP'U]/G
OINTMENT TOPICAL ONCE
Status: CANCELLED | OUTPATIENT
Start: 2021-10-21 | End: 2021-10-21

## 2021-10-21 RX ORDER — BETAMETHASONE DIPROPIONATE 0.05 %
OINTMENT (GRAM) TOPICAL ONCE
Status: CANCELLED | OUTPATIENT
Start: 2021-10-21 | End: 2021-10-21

## 2021-10-21 RX ORDER — BACITRACIN, NEOMYCIN, POLYMYXIN B 400; 3.5; 5 [USP'U]/G; MG/G; [USP'U]/G
OINTMENT TOPICAL ONCE
Status: CANCELLED | OUTPATIENT
Start: 2021-10-21 | End: 2021-10-21

## 2021-10-21 RX ADMIN — LIDOCAINE HYDROCHLORIDE: 40 SOLUTION TOPICAL at 09:05

## 2021-10-21 NOTE — PROGRESS NOTES
1227 [facility] - [place]  Progress Note and Procedure Note      Mark Greenberg  AGE: 77 y.o. GENDER: male  : 1945  EPISODE DATE:  10/21/2021      Subjective:     Chief Complaint   Patient presents with    Wound Check     RIGHT LEG         HISTORY of PRESENT ILLNESS HPI     Mark Greenberg is a 77 y.o. male who presents today for wound evaluation. History of Wound Context:Patient returns for continued treatment for dog bite wound with dehiscence of surgical closure. Sravan Pop has been changing the dressing with collagen and states he is doing well   Wound Pain Timing/Severity: none  Quality of pain: N/A  Severity:  0 / 10   Modifying Factors: None  Associated Signs/Symptoms: none    Wound Identification:  Wound Type: non-healing surgical and traumatic  Contributing Factors: none        PAST MEDICAL HISTORY        Diagnosis Date    CAD (coronary artery disease)     Leg wound, left, subsequent encounter 2020    traumatic    Leg wound, right, subsequent encounter 2020    traumatic    Osteoporosis        PAST SURGICAL HISTORY    Past Surgical History:   Procedure Laterality Date    INGUINAL HERNIA REPAIR Bilateral     JOINT REPLACEMENT      bilateral femurs    UMBILICAL HERNIA REPAIR         FAMILY HISTORY    History reviewed. No pertinent family history.     SOCIAL HISTORY    Social History     Tobacco Use    Smoking status: Former Smoker     Quit date: 1992     Years since quittin.9    Smokeless tobacco: Never Used   Vaping Use    Vaping Use: Never used   Substance Use Topics    Alcohol use: Not Currently    Drug use: Not Currently       ALLERGIES    No Known Allergies    MEDICATIONS    Current Outpatient Medications on File Prior to Encounter   Medication Sig Dispense Refill    calcium-vitamin D (OSCAL) 250-125 MG-UNIT per tablet Take 1 tablet by mouth daily      Multiple Vitamins-Minerals (CENTRUM SILVER ULTRA MENS PO) Take by mouth       No current facility-administered medications on file prior to encounter. REVIEW OF SYSTEMS    Pertinent items are noted in HPI. Objective:      BP (!) 163/75   Pulse 63   Temp 97.5 °F (36.4 °C) (Temporal)   Resp 16     PHYSICAL EXAM    Extremities: no cyanosis, clubbing or edema and near complete epithelialization of right lateral knee wound; small granulating wound remains. See measurements below        Assessment:     1. Non-healing surgical wound, subsequent encounter    2. Dog bite, subsequent encounter    3. Leg wound, right, subsequent encounter    4. Leg wound, right, initial encounter    5. Non-healing surgical wound, initial encounter    6. Dog bite, initial encounter          Wound Measurements:  Wound 08/05/21 Leg Lateral;Right; Lower #1 (Active)   Wound Image   10/07/21 0904   Wound Etiology Traumatic 08/05/21 0912   Wound Cleansed Cleansed with saline 10/21/21 0905   Dressing/Treatment Collagen with Ag 10/21/21 0924   Wound Length (cm) 1 cm 10/21/21 0905   Wound Width (cm) 1.5 cm 10/21/21 0905   Wound Depth (cm) 0.1 cm 10/21/21 0905   Wound Surface Area (cm^2) 1.5 cm^2 10/21/21 0905   Change in Wound Size % (l*w) 90 10/21/21 0905   Wound Volume (cm^3) 0.15 cm^3 10/21/21 0905   Wound Healing % 90 10/21/21 0905   Post-Procedure Length (cm) 1 cm 10/21/21 0924   Post-Procedure Width (cm) 1.5 cm 10/21/21 0924   Post-Procedure Depth (cm) 0.1 cm 10/21/21 0924   Post-Procedure Surface Area (cm^2) 1.5 cm^2 10/21/21 0924   Post-Procedure Volume (cm^3) 0.15 cm^3 10/21/21 0924   Distance Tunneling (cm) 0 cm 09/23/21 0848   Tunneling Position ___ O'Clock 0 08/05/21 0912   Undermining Maxium Distance (cm) 0 09/23/21 0848   Wound Assessment Pink/red 10/21/21 0905   Drainage Amount Small 10/21/21 0905   Drainage Description Serosanguinous 10/21/21 0905   Odor None 10/21/21 0905   Anaya-wound Assessment Intact 10/21/21 0905   Margins Defined edges 10/21/21 0905   Wound Thickness Description not for Pressure Injury silver     Cover and Secure with:     Cover and Secure with: Other mepilex border or gauze and kerlix   Avoid contact of tape with skin if possible.  When to change Dressing: [] Daily [] Every Other Day [] Once a week  [] Three times per week: [] Monday, Wednesday, Friday [] Tuesday, Thursday, Saturday  [] Do Not Change Dressing [x] Other: change Monday of next week       Edema Control:      [x] Elevate leg(s) above the level of the heart for 30 minutes 4-5 times a day and/or when sitting. [x] Avoid prolonged standing in one place. Dietary:  Important dietary reminders:  1. Increase Protein intake (i.e. Lean meats, fish, eggs, legumes, and yogurt)  2. No added salt  3. If diabetic, follow a diabetic diet and check glucose prior to meals or as instructed by your physician. Dietary Supplements:  [] Bernardo  [] 30ml ProStat  [] EnsureEnlive [] Ensure Max/Premier  [] Other:    If you are still having pain after you go home:   For wounds on lower legs or arms, elevate the affected limb.  Use over-the-counter medications you would normally use for pain as permitted by your primary care doctor.  For persistent pain not relieved by the above interventions, please call your primary care doctor. Return Appointment:   Return Appointment: With Dr. Ronald Bailey  in  [address] [place] [address])  o Scheduled weekly until (Date):      [] Return Appointment for a Wound Assessment with a nurse on:     o All other future appointments:  - No future appointments. [x] Orders placed during your visit:   Orders Placed This Encounter   Procedures    Initiate Outpatient Wound Care Protocol       Your nurse  is:  Tony Aguila     Electronically signed by Kalpana Jensen RN on 10/21/2021 at 9:24 MORGAN Brannon 8 Information: Should you experience any significant changes in your wound(s) or have questions about your wound care, please contact the [address] [place] [address] at [phone].  We are open from 8:00am - 4:30p Monday thru Friday except for Wednesdays which we are closed. Please give us 24-48 hours to return your call. Call your doctor now or seek immediate medical care if:    · You have symptoms of infection, such as:  ? Increased pain, swelling, warmth, or redness. ? Red streaks leading from the area. ? Pus draining from the area. ? A fever.         Physician for this visit and orders: Santos Yusuf MD    [] Patient unable to sign Discharge Instructions given to ECF/Transportation/POA            Electronically signed by Santos Yusuf MD on 10/21/2021 at 10:03 AM

## 2021-11-04 ENCOUNTER — HOSPITAL ENCOUNTER (OUTPATIENT)
Dept: WOUND CARE | Age: 76
Discharge: HOME | End: 2021-11-04
Payer: MEDICARE

## 2021-11-04 VITALS
HEART RATE: 54 BPM | RESPIRATION RATE: 16 BRPM | DIASTOLIC BLOOD PRESSURE: 73 MMHG | SYSTOLIC BLOOD PRESSURE: 152 MMHG | TEMPERATURE: 96.8 F

## 2021-11-04 DIAGNOSIS — T81.89XD NON-HEALING SURGICAL WOUND, SUBSEQUENT ENCOUNTER: Primary | ICD-10-CM
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
## 2021-11-04 DIAGNOSIS — T81.89XA NON-HEALING SURGICAL WOUND, INITIAL ENCOUNTER: ICD-10-CM

## 2021-11-04 DIAGNOSIS — S81.801D LEG WOUND, RIGHT, SUBSEQUENT ENCOUNTER: ICD-10-CM
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
## 2021-11-04 DIAGNOSIS — S81.801A LEG WOUND, RIGHT, INITIAL ENCOUNTER: ICD-10-CM

## 2021-11-04 PROCEDURE — 6370000000 HC RX 637 (ALT 250 FOR IP): Performed by: SPECIALIST

## 2021-11-04 PROCEDURE — 11042 DBRDMT SUBQ TIS 1ST 20SQCM/<: CPT

## 2021-11-04 PROCEDURE — 11042 DBRDMT SUBQ TIS 1ST 20SQCM/<: CPT | Performed by: SPECIALIST

## 2021-11-04 RX ORDER — LIDOCAINE 50 MG/G
OINTMENT TOPICAL ONCE
Status: CANCELLED | OUTPATIENT
Start: 2021-11-04 | End: 2021-11-04

## 2021-11-04 RX ORDER — LIDOCAINE HYDROCHLORIDE 20 MG/ML
JELLY TOPICAL ONCE
Status: CANCELLED | OUTPATIENT
Start: 2021-11-04 | End: 2021-11-04

## 2021-11-04 RX ORDER — BACITRACIN, NEOMYCIN, POLYMYXIN B 400; 3.5; 5 [USP'U]/G; MG/G; [USP'U]/G
OINTMENT TOPICAL ONCE
Status: CANCELLED | OUTPATIENT
Start: 2021-11-04 | End: 2021-11-04

## 2021-11-04 RX ORDER — LIDOCAINE HYDROCHLORIDE 40 MG/ML
SOLUTION TOPICAL ONCE
Status: COMPLETED | OUTPATIENT
Start: 2021-11-04 | End: 2021-11-04

## 2021-11-04 RX ORDER — LIDOCAINE 40 MG/G
CREAM TOPICAL ONCE
Status: CANCELLED | OUTPATIENT
Start: 2021-11-04 | End: 2021-11-04

## 2021-11-04 RX ORDER — CLOBETASOL PROPIONATE 0.5 MG/G
OINTMENT TOPICAL ONCE
Status: CANCELLED | OUTPATIENT
Start: 2021-11-04 | End: 2021-11-04

## 2021-11-04 RX ORDER — LIDOCAINE HYDROCHLORIDE 40 MG/ML
SOLUTION TOPICAL ONCE
Status: CANCELLED | OUTPATIENT
Start: 2021-11-04 | End: 2021-11-04

## 2021-11-04 RX ORDER — GINSENG 100 MG
CAPSULE ORAL ONCE
Status: CANCELLED | OUTPATIENT
Start: 2021-11-04 | End: 2021-11-04

## 2021-11-04 RX ORDER — BACITRACIN ZINC AND POLYMYXIN B SULFATE 500; 1000 [USP'U]/G; [USP'U]/G
OINTMENT TOPICAL ONCE
Status: CANCELLED | OUTPATIENT
Start: 2021-11-04 | End: 2021-11-04

## 2021-11-04 RX ORDER — BETAMETHASONE DIPROPIONATE 0.05 %
OINTMENT (GRAM) TOPICAL ONCE
Status: CANCELLED | OUTPATIENT
Start: 2021-11-04 | End: 2021-11-04

## 2021-11-04 RX ORDER — GENTAMICIN SULFATE 1 MG/G
OINTMENT TOPICAL ONCE
Status: CANCELLED | OUTPATIENT
Start: 2021-11-04 | End: 2021-11-04

## 2021-11-04 RX ADMIN — LIDOCAINE HYDROCHLORIDE: 40 SOLUTION TOPICAL at 09:25

## 2021-11-04 NOTE — PROGRESS NOTES
1227 [facility] - [place], [place]  Progress Note and Procedure Note      Steve Bravo  MEDICAL RECORD NUMBER:  [id number]  AGE: 77 y.o. GENDER: male  : 1945  EPISODE DATE:  2021    Subjective:     Chief Complaint   Patient presents with    Wound Check     right lower leg         HISTORY of PRESENT ILLNESS HPI     Steve Bravo is a 77 y.o. male who presents today for wound/ulcer evaluation. History of Wound Context: Patient returns for continued treatment for dog bite wound with dehiscence of surgical closure. Hanna Belle has been changing the dressing with collagen and states he is doing well    Pain Assessment:  Wound/Ulcer Pain Timing/Severity: none  Quality of pain: N/A  Severity:  0 / 10   Modifying Factors: None  Associated Signs/Symptoms: none    Ulcer Identification:  Ulcer Type: non-healing surgical and traumatic  Contributing Factors: none    Objective:      BP (!) 152/73   Pulse 54   Temp 96.8 °F (36 °C) (Temporal)   Resp 16     Wt Readings from Last 3 Encounters:   10/07/21 124 lb 1.9 oz (56.3 kg)   21 125 lb 10.6 oz (57 kg)   21 125 lb 10.6 oz (57 kg)       PHYSICAL EXAM    Extremities: no cyanosis, clubbing or edema and small focal area of hypertrophic granulation tissue distal portion of wound; otherwise completely reepithelialized    Assessment:      1. Non-healing surgical wound, subsequent encounter    2. Dog bite, subsequent encounter    3. Leg wound, right, subsequent encounter    4. Leg wound, right, initial encounter    5. Non-healing surgical wound, initial encounter    6. Dog bite, initial encounter         Procedure Note  Indications:  Based on my examination of this patient's wound(s) today, sharp excision is required to promote healing and evaluate the extent healing. Performed by: Brando Alex MD    Consent obtained?  Yes    Time out taken: Yes    Pain Control: Anesthetic: 4% Lidocaine Liquid Topical     Debridement:Excisional Debridement    Using curette the wound was sharply debrided    down through and including the removal of  epidermis, dermis and subcutaneous tissue. Devitalized Tissue Debrided:  Hypertrophic granulation tissue      Pre Debridement Measurements:  Are located in the Wound Documentation Flow Sheet   Wound #: 1     Post  Debridement Measurements:  Wound 08/05/21 Leg Lateral;Right; Lower #1 (Active)   Wound Image   11/04/21 0925   Wound Etiology Traumatic 08/05/21 0912   Wound Cleansed Cleansed with saline 11/04/21 0925   Dressing/Treatment Collagen with Ag 11/04/21 0942   Wound Length (cm) 1.1 cm 11/04/21 0925   Wound Width (cm) 1 cm 11/04/21 0925   Wound Depth (cm) 0.1 cm 11/04/21 0925   Wound Surface Area (cm^2) 1.1 cm^2 11/04/21 0925   Change in Wound Size % (l*w) 92.67 11/04/21 0925   Wound Volume (cm^3) 0.11 cm^3 11/04/21 0925   Wound Healing % 93 11/04/21 0925   Post-Procedure Length (cm) 1.2 cm 11/04/21 0942   Post-Procedure Width (cm) 1.2 cm 11/04/21 0942   Post-Procedure Depth (cm) 0.3 cm 11/04/21 0942   Post-Procedure Surface Area (cm^2) 1.44 cm^2 11/04/21 0942   Post-Procedure Volume (cm^3) 0.432 cm^3 11/04/21 0942   Distance Tunneling (cm) 0 cm 11/04/21 0925   Tunneling Position ___ O'Clock 0 11/04/21 0925   Undermining Maxium Distance (cm) 0 11/04/21 0925   Wound Assessment Pink/red; Hyper granulation tissue 11/04/21 0925   Drainage Amount Small 11/04/21 0925   Drainage Description Serosanguinous 11/04/21 0925   Odor None 11/04/21 0925   Anaya-wound Assessment Intact 11/04/21 0925   Margins Defined edges 11/04/21 0925   Wound Thickness Description not for Pressure Injury Full thickness 11/04/21 0925   Number of days: 91          Percent of Wound Debrided: 100%    Total Surface Area Debrided:  1.4 sq cm    Diabetic/Pressure/Non Pressure Ulcers only:  Ulcer: Non-Pressure ulcer, fat layer exposed    Bleeding: Minimal    Hemostasis Achieved: by pressure    Procedural Pain: 0  / 10     Post Procedural Pain: 0 / 10     Response to treatment:  Well tolerated by patient. Wound debrided down to healthy granulation tissue. We will continue with primary collagen dressing        Plan:     Treatment Note: Please see attached Discharge Instructions. These instructions were given and signed by the patient or POA    New Medication(s) at this visit:   New Prescriptions    No medications on file       Other orders at this visit:   Orders Placed This Encounter   Procedures   [address] [address] Protocol       Discharge Instructions          [facility] Physician Orders and Discharge Instructions  [address] [place]  Telephone: [phone]  NAME:  Robb Mcburney  YOB: 1945  MEDICAL RECORD NUMBER:  [id number]  DATE:  11/4/2021    Wash hands with soap and water prior to and after every dressing change. Wound Cleansing:   · Do not scrub or use excessive force. · With each dressing change, rinse wounds with 0.9% Saline. (May use wound wash or soft contact solution. Both can be purchased at a local drug store). · If unable to obtain saline, may use a gentle soap and water. · Keep wounds dry in the shower unless otherwise instructed by the physician. · For wounds on lower legs, cast covers can be purchased at local drug stores, so that you may shower and keep the wound(s) dry. []  Vashe Wash solution instructions (if prescribed): Apply enough Vashe to soak a piece of gauze and place on wound bed for 5-10 minutes. DO NOT rinse after Vashe has been applied. Follow dressing application as instructed below.     Anaya wound Topical Treatments:  Do not apply lotions, creams, or ointments to the skin around the wound bed unless directed as followed:     [] Apply around the wound: [] moisturizing lotion [] Antifungal ointment [] No-Sting barrier film [] Zinc paste [] Other:       Dressings:           Wound Location: right lower leg    Apply Primary Dressing to wound:       Collagen with silver     Cover and Secure with:     Cover and Secure with: 4X4 gauze pad  Conforming roll gauze   Avoid contact of tape with skin if possible.  When to change Dressing: [] Daily [] Every Other Day [] Once a week  [x] Three times per week: [] Monday, Wednesday, Friday [] Tuesday, Thursday, Saturday  [] Do Not Change Dressing [x] Other: Mondays and Thursdays    Dietary:  Important dietary reminders:  1. Increase Protein intake (i.e. Lean meats, fish, eggs, legumes, and yogurt)  2. No added salt  3. If diabetic, follow a diabetic diet and check glucose prior to meals or as instructed by your physician. Dietary Supplements:  [] Bernardo  [] 30ml ProStat  [] EnsureEnlive [] Ensure Max/Premier  [] Other:    If you are still having pain after you go home:   For wounds on lower legs or arms, elevate the affected limb.  Use over-the-counter medications you would normally use for pain as permitted by your primary care doctor.  For persistent pain not relieved by the above interventions, please call your primary care doctor. Return Appointment:   Return Appointment: With Dr. Giuliano Mcdaniels  in  [facility])  o Scheduled weekly until (Date):      [] Return Appointment for a Wound Assessment with a nurse on:     o All other future appointments:  - No future appointments. [x] Orders placed during your visit:   Orders Placed This Encounter   Procedures    Initiate Outpatient Wound Care Protocol       Your nurse  is:  [facility]     Electronically signed by Al Caraballo RN on 11/4/2021 at 9:42 AM     [facility] Information: Should you experience any significant changes in your wound(s) or have questions about your wound care, please contact the [address] [place] [address] at [phone]. We are open from 8:00am - 4:30p Monday thru Friday except for Wednesdays which we are closed. Please give us 24-48 hours to return your call.       Call your doctor now or seek immediate medical care if:    · You have symptoms of infection, such as:  ? Increased pain, swelling, warmth, or redness. ? Red streaks leading from the area. ? Pus draining from the area. ? A fever.         Physician for this visit and orders: Brando Alex MD    [] Patient unable to sign Discharge Instructions given to ECF/Transportation/POA        Electronically signed by Brando Alex MD on 11/4/2021 at 10:06 AM

## 2021-11-18 ENCOUNTER — HOSPITAL ENCOUNTER (OUTPATIENT)
Dept: WOUND CARE | Age: 76
Discharge: HOME | End: 2021-11-18
Payer: MEDICARE

## 2021-11-18 VITALS
TEMPERATURE: 96.8 F | DIASTOLIC BLOOD PRESSURE: 78 MMHG | RESPIRATION RATE: 20 BRPM | HEART RATE: 56 BPM | SYSTOLIC BLOOD PRESSURE: 175 MMHG
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
## 2021-11-18 DIAGNOSIS — S81.801A LEG WOUND, RIGHT, INITIAL ENCOUNTER: ICD-10-CM

## 2021-11-18 DIAGNOSIS — T81.89XA NON-HEALING SURGICAL WOUND, INITIAL ENCOUNTER: ICD-10-CM

## 2021-11-18 DIAGNOSIS — T81.89XD NON-HEALING SURGICAL WOUND, SUBSEQUENT ENCOUNTER: Primary | ICD-10-CM

## 2021-11-18 DIAGNOSIS — S81.801D LEG WOUND, RIGHT, SUBSEQUENT ENCOUNTER: ICD-10-CM

## 2021-11-18 PROCEDURE — 99212 OFFICE O/P EST SF 10 MIN: CPT

## 2021-11-18 PROCEDURE — 99212 OFFICE O/P EST SF 10 MIN: CPT | Performed by: SPECIALIST

## 2021-11-18 PROCEDURE — 6370000000 HC RX 637 (ALT 250 FOR IP): Performed by: SPECIALIST

## 2021-11-18 RX ORDER — BETAMETHASONE DIPROPIONATE 0.05 %
OINTMENT (GRAM) TOPICAL ONCE
Status: CANCELLED | OUTPATIENT
Start: 2021-11-18 | End: 2021-11-18

## 2021-11-18 RX ORDER — GENTAMICIN SULFATE 1 MG/G
OINTMENT TOPICAL ONCE
Status: CANCELLED | OUTPATIENT
Start: 2021-11-18 | End: 2021-11-18

## 2021-11-18 RX ORDER — LIDOCAINE HYDROCHLORIDE 40 MG/ML
SOLUTION TOPICAL ONCE
Status: COMPLETED | OUTPATIENT
Start: 2021-11-18 | End: 2021-11-18

## 2021-11-18 RX ORDER — LIDOCAINE HYDROCHLORIDE 20 MG/ML
JELLY TOPICAL ONCE
Status: CANCELLED | OUTPATIENT
Start: 2021-11-18 | End: 2021-11-18

## 2021-11-18 RX ORDER — BACITRACIN ZINC AND POLYMYXIN B SULFATE 500; 1000 [USP'U]/G; [USP'U]/G
OINTMENT TOPICAL ONCE
Status: CANCELLED | OUTPATIENT
Start: 2021-11-18 | End: 2021-11-18

## 2021-11-18 RX ORDER — LIDOCAINE 40 MG/G
CREAM TOPICAL ONCE
Status: CANCELLED | OUTPATIENT
Start: 2021-11-18 | End: 2021-11-18

## 2021-11-18 RX ORDER — LIDOCAINE 50 MG/G
OINTMENT TOPICAL ONCE
Status: CANCELLED | OUTPATIENT
Start: 2021-11-18 | End: 2021-11-18

## 2021-11-18 RX ORDER — GINSENG 100 MG
CAPSULE ORAL ONCE
Status: CANCELLED | OUTPATIENT
Start: 2021-11-18 | End: 2021-11-18

## 2021-11-18 RX ORDER — BACITRACIN, NEOMYCIN, POLYMYXIN B 400; 3.5; 5 [USP'U]/G; MG/G; [USP'U]/G
OINTMENT TOPICAL ONCE
Status: CANCELLED | OUTPATIENT
Start: 2021-11-18 | End: 2021-11-18

## 2021-11-18 RX ORDER — LIDOCAINE HYDROCHLORIDE 40 MG/ML
SOLUTION TOPICAL ONCE
Status: CANCELLED | OUTPATIENT
Start: 2021-11-18 | End: 2021-11-18

## 2021-11-18 RX ORDER — CLOBETASOL PROPIONATE 0.5 MG/G
OINTMENT TOPICAL ONCE
Status: CANCELLED | OUTPATIENT
Start: 2021-11-18 | End: 2021-11-18

## 2021-11-18 RX ADMIN — LIDOCAINE HYDROCHLORIDE: 40 SOLUTION TOPICAL at 08:57

## 2021-11-18 NOTE — PROGRESS NOTES
1227 [facility] - [place]  Progress Note and Procedure Note      Aracely Denis  AGE: 77 y.o. GENDER: male  : 1945  EPISODE DATE:  2021      Subjective:     Chief Complaint   Patient presents with    Wound Check     right lower leg         HISTORY of PRESENT ILLNESS HPI     Aracely Denis is a 77 y.o. male who presents today for wound evaluation. History of Wound Context: Patient returns for continued treatment for dog bite wound with dehiscence of surgical closure. Yvettenickiruy Omar has been changing the dressing with collagen and states he is doing well  Wound Pain Timing/Severity: none  Quality of pain: N/A  Severity:  0 / 10   Modifying Factors: None  Associated Signs/Symptoms: none    Wound Identification:  Wound Type: non-healing surgical and traumatic  Contributing Factors: none        PAST MEDICAL HISTORY        Diagnosis Date    CAD (coronary artery disease)     Leg wound, left, subsequent encounter 2020    traumatic    Leg wound, right, subsequent encounter 2020    traumatic    Osteoporosis        PAST SURGICAL HISTORY    Past Surgical History:   Procedure Laterality Date    INGUINAL HERNIA REPAIR Bilateral     JOINT REPLACEMENT      bilateral femurs    UMBILICAL HERNIA REPAIR         FAMILY HISTORY    History reviewed. No pertinent family history.     SOCIAL HISTORY    Social History     Tobacco Use    Smoking status: Former Smoker     Quit date: 1992     Years since quittin.9    Smokeless tobacco: Never Used   Vaping Use    Vaping Use: Never used   Substance Use Topics    Alcohol use: Not Currently    Drug use: Not Currently       ALLERGIES    No Known Allergies    MEDICATIONS    Current Outpatient Medications on File Prior to Encounter   Medication Sig Dispense Refill    calcium-vitamin D (OSCAL) 250-125 MG-UNIT per tablet Take 1 tablet by mouth daily      Multiple Vitamins-Minerals (CENTRUM SILVER ULTRA MENS PO) Take by mouth       No current facility-administered medications on file prior to encounter. REVIEW OF SYSTEMS    Pertinent items are noted in HPI. Objective:      BP (!) 175/78   Pulse 56   Temp 96.8 °F (36 °C) (Temporal)   Resp 20     PHYSICAL EXAM    Extremities: no cyanosis, clubbing or edema and complete epithelialization of right lower extremity wound        Assessment:     1. Non-healing surgical wound, subsequent encounter    2. Dog bite, subsequent encounter    3. Leg wound, right, subsequent encounter    4. Leg wound, right, initial encounter    5. Non-healing surgical wound, initial encounter    6. Dog bite, initial encounter          Wound Measurements:          Plan:     Treatment Note please see attached Discharge Instructions    New Medication(s) at this visit:   New Prescriptions    No medications on file       Other orders at this visit: No orders of the defined types were placed in this encounter. Written patient dismissal instructions given to patient and signed by patient or POA. Discharge Instructions       [address]  Physician [facility] [address] [place] [address] E. [address] [place] [address]. [place]  Telephone: [phone]    NAME:  Zenia Johnson  YOB: 1945  MEDICAL RECORD NUMBER:  [id number]  DATE:  11/18/2021    Congratulations!! You have completed your treatment.  Return to your Primary Care Physician for all your health issues.  Resume your ordinary activities as tolerated.  Take your medications as prescribed by your primary care physician.  Check your skin daily for cracks, bruises, sores, or dryness. Use a moisturizer as needed.  Clean and dry your skin, using mild soap and warm water (not hot).  Avoid alcohol and caffeine and do not smoke.  Maintain a nutritious diet. Ask your primary care doctor about adding a multivitamin to your medication regimen.     Avoid pressure on your healed wound site. Dorodilon Mcarthurs mepitel border on  Right lower leg for 3 days then remove      THANK YOU FOR ALLOWING US TO SERVE YOU.  PLEASE CALL IF YOU DEVELOP ANOTHER WOUND [phone]    [] Patient unable to sign Discharge Instructions given to ECF/Transportation/POA    Electronically signed by aLnce Smith RN on 11/18/2021 at 9:09 AM            Electronically signed by Brando Alex MD on 11/18/2021 at 9:19 AM

## 2024-05-21 ENCOUNTER — TRANSCRIBE ORDERS (OUTPATIENT)
Age: 79
End: 2024-05-21
Payer: MEDICARE

## 2024-05-21 DIAGNOSIS — Z01.818 PREOPERATIVE CLEARANCE: Primary | ICD-10-CM

## 2024-06-05 ENCOUNTER — PRE-ADMISSION TESTING (OUTPATIENT)
Dept: PREADMISSION TESTING | Facility: HOSPITAL | Age: 79
End: 2024-06-05
Payer: MEDICARE

## 2024-06-05 ENCOUNTER — HOSPITAL ENCOUNTER (OUTPATIENT)
Dept: GENERAL RADIOLOGY | Facility: HOSPITAL | Age: 79
Discharge: HOME | End: 2024-06-05
Payer: MEDICARE

## 2024-06-05 ENCOUNTER — OFFICE VISIT (OUTPATIENT)
Dept: CARDIOLOGY | Facility: CLINIC | Age: 79
End: 2024-06-05
Payer: MEDICARE

## 2024-06-05 ENCOUNTER — HOSPITAL ENCOUNTER (OUTPATIENT)
Dept: CARDIOLOGY | Facility: HOSPITAL | Age: 79
Discharge: HOME | End: 2024-06-05
Payer: MEDICARE

## 2024-06-05 VITALS
OXYGEN SATURATION: 96 % | TEMPERATURE: 97.5 F | BODY MASS INDEX: 22.47 KG/M2 | SYSTOLIC BLOOD PRESSURE: 195 MMHG | RESPIRATION RATE: 16 BRPM | WEIGHT: 143.2 LBS | HEART RATE: 70 BPM | DIASTOLIC BLOOD PRESSURE: 81 MMHG | HEIGHT: 67 IN

## 2024-06-05 VITALS
HEIGHT: 70 IN | OXYGEN SATURATION: 97 % | DIASTOLIC BLOOD PRESSURE: 90 MMHG | BODY MASS INDEX: 20.33 KG/M2 | HEART RATE: 71 BPM | SYSTOLIC BLOOD PRESSURE: 170 MMHG | WEIGHT: 142 LBS

## 2024-06-05 DIAGNOSIS — R06.02 SOB (SHORTNESS OF BREATH): ICD-10-CM

## 2024-06-05 DIAGNOSIS — Z01.810 PREOPERATIVE CARDIOVASCULAR EXAMINATION: ICD-10-CM

## 2024-06-05 DIAGNOSIS — R94.31 ABNORMAL EKG: ICD-10-CM

## 2024-06-05 DIAGNOSIS — M79.89 LEFT LEG SWELLING: Primary | ICD-10-CM

## 2024-06-05 DIAGNOSIS — I45.10 RBBB: ICD-10-CM

## 2024-06-05 DIAGNOSIS — I10 ESSENTIAL HYPERTENSION: ICD-10-CM

## 2024-06-05 LAB
ALBUMIN SERPL-MCNC: 4.2 G/DL (ref 3.5–5.2)
ALBUMIN/GLOB SERPL: 1.4 G/DL
ALP SERPL-CCNC: 110 U/L (ref 39–117)
ALT SERPL W P-5'-P-CCNC: 13 U/L (ref 1–41)
ANION GAP SERPL CALCULATED.3IONS-SCNC: 10.6 MMOL/L (ref 5–15)
AST SERPL-CCNC: 22 U/L (ref 1–40)
BACTERIA UR QL AUTO: NORMAL /HPF
BH CV LOWER VASCULAR LEFT COMMON FEMORAL AUGMENT: NORMAL
BH CV LOWER VASCULAR LEFT COMMON FEMORAL COMPETENT: NORMAL
BH CV LOWER VASCULAR LEFT COMMON FEMORAL COMPRESS: NORMAL
BH CV LOWER VASCULAR LEFT COMMON FEMORAL PHASIC: NORMAL
BH CV LOWER VASCULAR LEFT COMMON FEMORAL SPONT: NORMAL
BH CV LOWER VASCULAR LEFT DISTAL FEMORAL COMPRESS: NORMAL
BH CV LOWER VASCULAR LEFT GASTRONEMIUS COMPRESS: NORMAL
BH CV LOWER VASCULAR LEFT GREATER SAPH AK COMPRESS: NORMAL
BH CV LOWER VASCULAR LEFT GREATER SAPH BK COMPRESS: NORMAL
BH CV LOWER VASCULAR LEFT LESSER SAPH COMPRESS: NORMAL
BH CV LOWER VASCULAR LEFT MID FEMORAL AUGMENT: NORMAL
BH CV LOWER VASCULAR LEFT MID FEMORAL COMPETENT: NORMAL
BH CV LOWER VASCULAR LEFT MID FEMORAL COMPRESS: NORMAL
BH CV LOWER VASCULAR LEFT MID FEMORAL PHASIC: NORMAL
BH CV LOWER VASCULAR LEFT MID FEMORAL SPONT: NORMAL
BH CV LOWER VASCULAR LEFT PERONEAL COMPRESS: NORMAL
BH CV LOWER VASCULAR LEFT POPLITEAL AUGMENT: NORMAL
BH CV LOWER VASCULAR LEFT POPLITEAL COMPETENT: NORMAL
BH CV LOWER VASCULAR LEFT POPLITEAL COMPRESS: NORMAL
BH CV LOWER VASCULAR LEFT POPLITEAL PHASIC: NORMAL
BH CV LOWER VASCULAR LEFT POPLITEAL SPONT: NORMAL
BH CV LOWER VASCULAR LEFT POSTERIOR TIBIAL COMPRESS: NORMAL
BH CV LOWER VASCULAR LEFT PROFUNDA FEMORAL COMPRESS: NORMAL
BH CV LOWER VASCULAR LEFT PROXIMAL FEMORAL COMPRESS: NORMAL
BH CV LOWER VASCULAR LEFT SAPHENOFEMORAL JUNCTION COMPRESS: NORMAL
BH CV LOWER VASCULAR RIGHT COMMON FEMORAL AUGMENT: NORMAL
BH CV LOWER VASCULAR RIGHT COMMON FEMORAL COMPETENT: NORMAL
BH CV LOWER VASCULAR RIGHT COMMON FEMORAL COMPRESS: NORMAL
BH CV LOWER VASCULAR RIGHT COMMON FEMORAL PHASIC: NORMAL
BH CV LOWER VASCULAR RIGHT COMMON FEMORAL SPONT: NORMAL
BH CV LOWER VASCULAR RIGHT DISTAL FEMORAL COMPRESS: NORMAL
BH CV LOWER VASCULAR RIGHT GASTRONEMIUS COMPRESS: NORMAL
BH CV LOWER VASCULAR RIGHT GREATER SAPH AK COMPRESS: NORMAL
BH CV LOWER VASCULAR RIGHT GREATER SAPH BK COMPRESS: NORMAL
BH CV LOWER VASCULAR RIGHT LESSER SAPH COMPRESS: NORMAL
BH CV LOWER VASCULAR RIGHT MID FEMORAL AUGMENT: NORMAL
BH CV LOWER VASCULAR RIGHT MID FEMORAL COMPETENT: NORMAL
BH CV LOWER VASCULAR RIGHT MID FEMORAL COMPRESS: NORMAL
BH CV LOWER VASCULAR RIGHT MID FEMORAL PHASIC: NORMAL
BH CV LOWER VASCULAR RIGHT MID FEMORAL SPONT: NORMAL
BH CV LOWER VASCULAR RIGHT PERONEAL COMPRESS: NORMAL
BH CV LOWER VASCULAR RIGHT POPLITEAL AUGMENT: NORMAL
BH CV LOWER VASCULAR RIGHT POPLITEAL COMPETENT: NORMAL
BH CV LOWER VASCULAR RIGHT POPLITEAL COMPRESS: NORMAL
BH CV LOWER VASCULAR RIGHT POPLITEAL PHASIC: NORMAL
BH CV LOWER VASCULAR RIGHT POPLITEAL SPONT: NORMAL
BH CV LOWER VASCULAR RIGHT POSTERIOR TIBIAL COMPRESS: NORMAL
BH CV LOWER VASCULAR RIGHT PROFUNDA FEMORAL COMPRESS: NORMAL
BH CV LOWER VASCULAR RIGHT PROXIMAL FEMORAL COMPRESS: NORMAL
BH CV LOWER VASCULAR RIGHT SAPHENOFEMORAL JUNCTION COMPRESS: NORMAL
BH CV VAS PRELIMINARY FINDINGS SCRIPTING: 1
BILIRUB SERPL-MCNC: 0.6 MG/DL (ref 0–1.2)
BILIRUB UR QL STRIP: NEGATIVE
BUN SERPL-MCNC: 15 MG/DL (ref 8–23)
BUN/CREAT SERPL: 23.1 (ref 7–25)
CALCIUM SPEC-SCNC: 9.2 MG/DL (ref 8.6–10.5)
CHLORIDE SERPL-SCNC: 106 MMOL/L (ref 98–107)
CHOLEST SERPL-MCNC: 146 MG/DL (ref 0–200)
CLARITY UR: CLEAR
CO2 SERPL-SCNC: 26.4 MMOL/L (ref 22–29)
COLOR UR: YELLOW
CREAT SERPL-MCNC: 0.65 MG/DL (ref 0.76–1.27)
DEPRECATED RDW RBC AUTO: 49.4 FL (ref 37–54)
EGFRCR SERPLBLD CKD-EPI 2021: 96.4 ML/MIN/1.73
ERYTHROCYTE [DISTWIDTH] IN BLOOD BY AUTOMATED COUNT: 13.4 % (ref 12.3–15.4)
GLOBULIN UR ELPH-MCNC: 2.9 GM/DL
GLUCOSE SERPL-MCNC: 92 MG/DL (ref 65–99)
GLUCOSE UR STRIP-MCNC: NEGATIVE MG/DL
HBA1C MFR BLD: 5.2 % (ref 4.8–5.6)
HCT VFR BLD AUTO: 33.7 % (ref 37.5–51)
HDLC SERPL-MCNC: 79 MG/DL (ref 40–60)
HGB BLD-MCNC: 11 G/DL (ref 13–17.7)
HGB UR QL STRIP.AUTO: NEGATIVE
HYALINE CASTS UR QL AUTO: NORMAL /LPF
INR PPP: 1.13 (ref 0.9–1.1)
KETONES UR QL STRIP: NEGATIVE
LDLC SERPL CALC-MCNC: 55 MG/DL (ref 0–100)
LDLC/HDLC SERPL: 0.7 {RATIO}
LEUKOCYTE ESTERASE UR QL STRIP.AUTO: NEGATIVE
MCH RBC QN AUTO: 32.9 PG (ref 26.6–33)
MCHC RBC AUTO-ENTMCNC: 32.6 G/DL (ref 31.5–35.7)
MCV RBC AUTO: 100.9 FL (ref 79–97)
NITRITE UR QL STRIP: NEGATIVE
NT-PROBNP SERPL-MCNC: 420 PG/ML (ref 0–1800)
PH UR STRIP.AUTO: 7 [PH] (ref 5–8)
PLATELET # BLD AUTO: 263 10*3/MM3 (ref 140–450)
PMV BLD AUTO: 10.1 FL (ref 6–12)
POTASSIUM SERPL-SCNC: 3.7 MMOL/L (ref 3.5–5.2)
PROT SERPL-MCNC: 7.1 G/DL (ref 6–8.5)
PROT UR QL STRIP: NEGATIVE
PROTHROMBIN TIME: 14.7 SECONDS (ref 11.7–14.2)
RBC # BLD AUTO: 3.34 10*6/MM3 (ref 4.14–5.8)
RBC # UR STRIP: NORMAL /HPF
REF LAB TEST METHOD: NORMAL
SODIUM SERPL-SCNC: 143 MMOL/L (ref 136–145)
SP GR UR STRIP: 1.01 (ref 1–1.03)
SQUAMOUS #/AREA URNS HPF: NORMAL /HPF
TRIGL SERPL-MCNC: 60 MG/DL (ref 0–150)
UROBILINOGEN UR QL STRIP: NORMAL
VLDLC SERPL-MCNC: 12 MG/DL (ref 5–40)
WBC # UR STRIP: NORMAL /HPF
WBC NRBC COR # BLD AUTO: 6.25 10*3/MM3 (ref 3.4–10.8)

## 2024-06-05 PROCEDURE — 81001 URINALYSIS AUTO W/SCOPE: CPT | Performed by: ORTHOPAEDIC SURGERY

## 2024-06-05 PROCEDURE — 73502 X-RAY EXAM HIP UNI 2-3 VIEWS: CPT

## 2024-06-05 PROCEDURE — 93970 EXTREMITY STUDY: CPT

## 2024-06-05 PROCEDURE — 83036 HEMOGLOBIN GLYCOSYLATED A1C: CPT

## 2024-06-05 PROCEDURE — 85027 COMPLETE CBC AUTOMATED: CPT

## 2024-06-05 PROCEDURE — 83880 ASSAY OF NATRIURETIC PEPTIDE: CPT | Performed by: INTERNAL MEDICINE

## 2024-06-05 PROCEDURE — 85610 PROTHROMBIN TIME: CPT

## 2024-06-05 PROCEDURE — 36415 COLL VENOUS BLD VENIPUNCTURE: CPT

## 2024-06-05 PROCEDURE — 71046 X-RAY EXAM CHEST 2 VIEWS: CPT

## 2024-06-05 PROCEDURE — 80053 COMPREHEN METABOLIC PANEL: CPT

## 2024-06-05 PROCEDURE — 93970 EXTREMITY STUDY: CPT | Performed by: SURGERY

## 2024-06-05 PROCEDURE — 80061 LIPID PANEL: CPT | Performed by: INTERNAL MEDICINE

## 2024-06-05 RX ORDER — MULTIPLE VITAMINS W/ MINERALS TAB 9MG-400MCG
1 TAB ORAL DAILY
Status: ON HOLD | COMMUNITY

## 2024-06-05 RX ORDER — CARVEDILOL 6.25 MG/1
6.25 TABLET ORAL 2 TIMES DAILY
Qty: 60 TABLET | Refills: 11 | Status: ON HOLD | OUTPATIENT
Start: 2024-06-05

## 2024-06-05 NOTE — DISCHARGE INSTRUCTIONS
Take the following medications the morning of surgery:  CARVEDILOL    THE HOSPITAL WILL CALL YOU 1-2 DAYS PRIOR TO SURGERY WITH YOUR ARRIVAL TIME.      If you are on prescription narcotic pain medication to control your pain you may also take that medication the morning of surgery.    General Instructions:  Do not eat solid food after midnight the night before surgery.  You may drink clear liquids day of surgery but must stop at least one hour before your hospital arrival time.  It is beneficial for you to have a clear drink that contains carbohydrates the day of surgery.  We suggest a 12 to 20 ounce bottle of Gatorade or Powerade for non-diabetic patients or a 12 to 20 ounce bottle of G2 or Powerade Zero for diabetic patients. (Pediatric patients, are not advised to drink a 12 to 20 ounce carbohydrate drink)    Clear liquids are liquids you can see through.  Nothing red in color.     Plain water                               Sports drinks  Sodas                                   Gelatin (Jell-O)  Fruit juices without pulp such as white grape juice and apple juice  Popsicles that contain no fruit or yogurt  Tea or coffee (no cream or milk added)  Gatorade / Powerade  G2 / Powerade Zero    Infants may have breast milk up to four hours before surgery.  Infants drinking formula may drink formula up to six hours before surgery.   Patients who avoid smoking, chewing tobacco and alcohol for 4 weeks prior to surgery have a reduced risk of post-operative complications.  Quit smoking as many days before surgery as you can.  Do not smoke, use chewing tobacco or drink alcohol the day of surgery.   If applicable bring your C-PAP/ BI-PAP machine in with you to preop day of surgery.  Bring any papers given to you in the doctor’s office.  Wear clean comfortable clothes.  Do not wear contact lenses, false eyelashes or make-up.  Bring a case for your glasses.   Bring crutches or walker if applicable.  Remove all piercings.  Leave  jewelry and any other valuables at home.  Hair extensions with metal clips must be removed prior to surgery.  The Pre-Admission Testing nurse will instruct you to bring medications if unable to obtain an accurate list in Pre-Admission Testing.        If you were given a blood bank ID arm band remember to bring it with you the day of surgery.    Preventing a Surgical Site Infection:  For 2 to 3 days before surgery, avoid shaving with a razor because the razor can irritate skin and make it easier to develop an infection.    Any areas of open skin can increase the risk of a post-operative wound infection by allowing bacteria to enter and travel throughout the body.  Notify your surgeon if you have any skin wounds / rashes even if it is not near the expected surgical site.  The area will need assessed to determine if surgery should be delayed until it is healed.  The night prior to surgery shower using a fresh bar of anti-bacterial soap (such as Dial) and clean washcloth.  Sleep in a clean bed with clean clothing.  Do not allow pets to sleep with you.  Shower on the morning of surgery using a fresh bar of anti-bacterial soap (such as Dial) and clean washcloth.  Dry with a clean towel and dress in clean clothing.  Ask your surgeon if you will be receiving antibiotics prior to surgery.  Make sure you, your family, and all healthcare providers clean their hands with soap and water or an alcohol based hand  before caring for you or your wound.    Day of surgery:  Your arrival time is approximately two hours before your scheduled surgery time.  Upon arrival, a Pre-op nurse and Anesthesiologist will review your health history, obtain vital signs, and answer questions you may have.  The only belongings needed at this time will be a list of your home medications and if applicable your C-PAP/BI-PAP machine.  A Pre-op nurse will start an IV and you may receive medication in preparation for surgery, including something to  help you relax.     Please be aware that surgery does come with discomfort.  We want to make every effort to control your discomfort so please discuss any uncontrolled symptoms with your nurse.   Your doctor will most likely have prescribed pain medications.      If you are going home after surgery you will receive individualized written care instructions before being discharged.  A responsible adult must drive you to and from the hospital on the day of your surgery and ideally stay with you through the night.   .  Discharge prescriptions can be filled by the hospital pharmacy during regular pharmacy hours.  If you are having surgery late in the day/evening your prescription may be e-prescribed to your pharmacy.  Please verify your pharmacy hours or chose a 24 hour pharmacy to avoid not having access to your prescription because your pharmacy has closed for the day.    If you are staying overnight following surgery, you will be transported to your hospital room following the recovery period.  [facility] has all private rooms.    If you have any questions please call Pre-Admission Testing at [phone].  Deductibles and co-payments are collected on the day of service. Please be prepared to pay the required co-pay, deductible or deposit on the day of service as defined by your plan.    Call your surgeon immediately if you experience any of the following symptoms:  Sore Throat  Shortness of Breath or difficulty breathing  Cough  Chills  Body soreness or muscle pain  Headache  Fever  New loss of taste or smell  Do not arrive for your surgery ill.  Your procedure will need to be rescheduled to another time.  You will need to call your physician before the day of surgery to avoid any unnecessary exposure to hospital staff as well as other patients.    CHLORHEXIDINE CLOTH INSTRUCTIONS  The morning of surgery follow these instructions using the Chlorhexidine cloths you've been given.  These steps reduce  bacteria on the body.  Do not use the cloths near your eyes, ears mouth, genitalia or on open wounds.  Throw the cloths away after use but do not try to flush them down a toilet.      Open and remove one cloth at a time from the package.    Leave the cloth unfolded and begin the bathing.  Massage the skin with the cloths using gentle pressure to remove bacteria.  Do not scrub harshly.   Follow the steps below with one 2% CHG cloth per area (6 total cloths).  One cloth for neck, shoulders and chest.  One cloth for both arms, hands, fingers and underarms (do underarms last).  One cloth for the abdomen followed by groin.  One cloth for right leg and foot including between the toes.  One cloth for left leg and foot including between the toes.  The last cloth is to be used for the back of the neck, back and buttocks.    Allow the CHG to air dry 3 minutes on the skin which will give it time to work and decrease the chance of irritation.  The skin may feel sticky until it is dry.  Do not rinse with water or any other liquid or you will lose the beneficial effects of the CHG.  If mild skin irritation occurs, do rinse the skin to remove the CHG.  Report this to the nurse at time of admission.  Do not apply lotions, creams, ointments, deodorants or perfumes after using the clothes. Dress in clean clothes before coming to the hospital.

## 2024-06-05 NOTE — PROGRESS NOTES
"PATIENTINFORMATION    Date of Office Visit: 2024  Encounter Provider: Gen Vasquez MD  Place of Service: [facility] CARDIOLOGY  Patient Name: Chacho Martinez  : 1945    Subjective:     Encounter Date:2024      Patient ID: Chacho Martinez is a 79 y.o. male.    Chief Complaint   Patient presents with    Pre-op Exam     HPI  Mr. Martinez is a pleasant 79 years old gentleman came to cardiac clinic for preoperative cardiovascular examination and evaluation of abnormal preop EKG.  He is going to have right hip replacement for chronic severe osteoarthritis and pain interfering with activities.  He limps when he walks and right lower extremity is shortened and left.  Still he does stationary bike for about 30 minutes almost every day without chest pain or significant shortness of breath.  no orthopnea, PND, palpitations, presyncope syncope or extremity swelling.  No prior diagnosis of hypertension  He noted left leg swelling that started about 2 days ago.  Denies pain or change in his breathing.  He reports having coronary angiogram in  and he was told his arteries are stiff but no other abnormalities and no prescription was written.      ROS  All systems reviewed and negative except as noted in HPI.    Past Medical History:   Diagnosis Date    Arthritis     Atherosclerosis     Avascular necrosis     RIGHT HIP    GERD (gastroesophageal reflux disease)     Heart valve disorder     \"FLOPPY\"    History of gastric ulcer     AGE 20'S    [place] (hard of hearing)     Hypertension     Right hip pain     Scab     LEFT SHIN       Past Surgical History:   Procedure Laterality Date    FEMUR FRACTURE SURGERY Right     3 SCREWS    FEMUR FRACTURE SURGERY Left     \"BALL AND SHAFT\"    HERNIA REPAIR      WITH MESH, INGUINAL AND UMBILICAL       Social History     Socioeconomic History    Marital status: Single   Tobacco Use    Smoking status: Former     Types: Cigarettes     Passive " "exposure: Never    Smokeless tobacco: Never    Tobacco comments:     QUIT AGE 30'S   Vaping Use    Vaping status: Never Used   Substance and Sexual Activity    Alcohol use: Never    Drug use: Never    Sexual activity: Defer       Family History   Problem Relation Age of Onset    Malig Hyperthermia Neg Hx            ECG 12 Lead    Date/Time: 6/5/2024 5:44 PM  Performed by: Gen Vasquez MD    Authorized by: Gen Vasquez MD  Comparison: not compared with previous ECG   Rhythm: sinus rhythm  Rate: normal  Conduction: left bundle branch block  ST Segments: ST segments normal  T Waves: T waves normal  QRS axis: normal  Other: no other findings    Clinical impression: abnormal EKG             Objective:     /90   Pulse 71   Ht 177.8 cm (70\")   Wt 64.4 kg (142 lb)   SpO2 97%   BMI 20.37 kg/m²  Body mass index is 20.37 kg/m².     Constitutional:       General: Not in acute distress.     Appearance: Well-developed. Not diaphoretic.   Eyes:      Pupils: Pupils are equal, round, and reactive to light.   HENT:      Head: Normocephalic and atraumatic.   Neck:      Thyroid: No thyromegaly.   Pulmonary:      Effort: Pulmonary effort is normal. No respiratory distress.      Breath sounds: Normal breath sounds. No wheezing. No rales.   Chest:      Chest wall: Not tender to palpatation.   Cardiovascular:      Normal rate. Regular rhythm.      No gallop.    Pulses:     Intact distal pulses.   Edema:     Peripheral edema present.     Pretibial: 2+ pitting edema of the right pretibial area.  Abdominal:      General: Bowel sounds are normal. There is no distension.      Palpations: Abdomen is soft.      Tenderness: There is no guarding.   Musculoskeletal: Normal range of motion.         General: No deformity.      Cervical back: Normal range of motion and neck supple. Skin:     General: Skin is warm and dry.      Findings: No rash.   Neurological:      Mental Status: Alert and oriented to person, place, and " time.      Cranial Nerves: No cranial nerve deficit.      Deep Tendon Reflexes: Reflexes are normal and symmetric.   Psychiatric:         Judgment: Judgment normal.         Review Of Data: I have reviewed pertinent recent labs, images and documents and pertinent findings included in HPI or assessment below.    Lipid Panel          6/5/2024    16:23   Lipid Panel   Total Cholesterol 146    Triglycerides 60    HDL Cholesterol 79    VLDL Cholesterol 12    LDL Cholesterol  55    LDL/HDL Ratio 0.70          Assessment/Plan:         Preoperative cardiovascular examination  Elevated blood pressure  Right bundle branch block otherwise unremarkable EKG  Left leg swelling that is relatively new.  Chronic severe right hip osteoarthritis with prior right femur repair in 2002.  Right lower extremity shorter than left.  Pending right total arthroplasty on 06/10/24    Mr. Martinez is a very active gentleman and does daily stationary exercise biking for 30 minutes.  METs exceed 4.  Other than elevated blood pressure no active cardiac condition.  Not sure if pain or anxiety contributing to elevated blood pressure but it was significant.  I will put him on Coreg.  He will get a stat left lower extremity venous Doppler to rule out DVT because of new left leg swelling.  Denies symptoms of PE.  Check lipid panel, proBNP, echocardiogram-should not affect surgery planning.    If venous Doppler is negative for PE he can proceed with planned surgery.  Take carvedilol on the morning of surgery with sips of water.  He will check blood pressure log at home and call office.    Diagnosis and plan of care discussed with patient and verbalized understanding.  More than 65 minutes spent reviewing patient chart, examination, discussing diagnosis and plan of care and charting.          Your medication list            Accurate as of June 5, 2024  5:39 PM. If you have any questions, ask your nurse or doctor.                START taking these  medications        Instructions Last Dose Given Next Dose Due   carvedilol 6.25 MG tablet  Commonly known as: COREG  Started by: Gen Vasquez MD      Take 1 tablet by mouth 2 (Two) Times a Day.              CONTINUE taking these medications        Instructions Last Dose Given Next Dose Due   Calcium Carbonate-Vitamin D 250-3.125 MG-MCG tablet      Take 1 tablet by mouth Daily.       Centrum Silver 50+Men tablet tablet      Take 1 tablet by mouth Daily.                 Where to Get Your Medications        These medications were sent to [place] [id number]      Hours: Monday to Friday 7 AM to 6 PM, Saturday & Sunday 8 AM to 4:30 PM (Closed 12 PM to 12:30 PM) Phone: [phone]   carvedilol 6.25 MG tablet             Gen Vasquez MD  06/05/24  17:39 EDT

## 2024-06-10 ENCOUNTER — APPOINTMENT (OUTPATIENT)
Dept: GENERAL RADIOLOGY | Facility: HOSPITAL | Age: 79
End: 2024-06-10
Payer: MEDICARE

## 2024-06-10 ENCOUNTER — ANESTHESIA EVENT (OUTPATIENT)
Dept: PERIOP | Facility: HOSPITAL | Age: 79
End: 2024-06-10
Payer: MEDICARE

## 2024-06-10 ENCOUNTER — ANESTHESIA (OUTPATIENT)
Dept: PERIOP | Facility: HOSPITAL | Age: 79
End: 2024-06-10
Payer: MEDICARE

## 2024-06-10 ENCOUNTER — HOSPITAL ENCOUNTER (OUTPATIENT)
Facility: HOSPITAL | Age: 79
LOS: 1 days | Discharge: HOME | End: 2024-06-13
Attending: ORTHOPAEDIC SURGERY | Admitting: ORTHOPAEDIC SURGERY
Payer: MEDICARE

## 2024-06-10 DIAGNOSIS — I10 ESSENTIAL HYPERTENSION: ICD-10-CM

## 2024-06-10 DIAGNOSIS — R06.02 SOB (SHORTNESS OF BREATH): ICD-10-CM

## 2024-06-10 PROBLEM — Z96.649 S/P TOTAL HIP ARTHROPLASTY: Status: ACTIVE | Noted: 2024-06-10

## 2024-06-10 PROBLEM — Z96.649 HIP JOINT REPLACEMENT STATUS: Status: ACTIVE | Noted: 2024-06-10

## 2024-06-10 LAB
ANION GAP SERPL CALCULATED.3IONS-SCNC: 12.3 MMOL/L (ref 5–15)
BUN SERPL-MCNC: 12 MG/DL (ref 8–23)
BUN/CREAT SERPL: 16.9 (ref 7–25)
CALCIUM SPEC-SCNC: 8 MG/DL (ref 8.6–10.5)
CHLORIDE SERPL-SCNC: 105 MMOL/L (ref 98–107)
CO2 SERPL-SCNC: 21.7 MMOL/L (ref 22–29)
CREAT SERPL-MCNC: 0.71 MG/DL (ref 0.76–1.27)
EGFRCR SERPLBLD CKD-EPI 2021: 93.9 ML/MIN/1.73
GLUCOSE SERPL-MCNC: 147 MG/DL (ref 65–99)
HCT VFR BLD AUTO: 25.8 % (ref 37.5–51)
HGB BLD-MCNC: 8.9 G/DL (ref 13–17.7)
POTASSIUM SERPL-SCNC: 3.7 MMOL/L (ref 3.5–5.2)
SODIUM SERPL-SCNC: 139 MMOL/L (ref 136–145)

## 2024-06-10 PROCEDURE — 25010000002 CLONIDINE PER 1 MG: Performed by: ORTHOPAEDIC SURGERY

## 2024-06-10 PROCEDURE — 25010000002 CEFAZOLIN PER 500 MG: Performed by: ORTHOPAEDIC SURGERY

## 2024-06-10 PROCEDURE — 72170 X-RAY EXAM OF PELVIS: CPT

## 2024-06-10 PROCEDURE — 25010000002 EPINEPHRINE 1 MG/ML SOLUTION 30 ML VIAL: Performed by: ORTHOPAEDIC SURGERY

## 2024-06-10 PROCEDURE — 25010000002 VANCOMYCIN 1 G RECONSTITUTED SOLUTION: Performed by: ORTHOPAEDIC SURGERY

## 2024-06-10 PROCEDURE — C1776 JOINT DEVICE (IMPLANTABLE): HCPCS | Performed by: ORTHOPAEDIC SURGERY

## 2024-06-10 PROCEDURE — 25010000002 SUGAMMADEX 200 MG/2ML SOLUTION: Performed by: NURSE ANESTHETIST, CERTIFIED REGISTERED

## 2024-06-10 PROCEDURE — 85014 HEMATOCRIT: CPT | Performed by: ORTHOPAEDIC SURGERY

## 2024-06-10 PROCEDURE — 25010000002 PROPOFOL 10 MG/ML EMULSION: Performed by: NURSE ANESTHETIST, CERTIFIED REGISTERED

## 2024-06-10 PROCEDURE — 76000 FLUOROSCOPY <1 HR PHYS/QHP: CPT

## 2024-06-10 PROCEDURE — 25010000002 ONDANSETRON PER 1 MG: Performed by: NURSE ANESTHETIST, CERTIFIED REGISTERED

## 2024-06-10 PROCEDURE — 25010000002 HYDRALAZINE PER 20 MG: Performed by: NURSE ANESTHETIST, CERTIFIED REGISTERED

## 2024-06-10 PROCEDURE — 85018 HEMOGLOBIN: CPT | Performed by: ORTHOPAEDIC SURGERY

## 2024-06-10 PROCEDURE — 25010000002 KETOROLAC TROMETHAMINE PER 15 MG: Performed by: ORTHOPAEDIC SURGERY

## 2024-06-10 PROCEDURE — C1713 ANCHOR/SCREW BN/BN,TIS/BN: HCPCS | Performed by: ORTHOPAEDIC SURGERY

## 2024-06-10 PROCEDURE — 25010000002 DEXAMETHASONE PER 1 MG: Performed by: NURSE ANESTHETIST, CERTIFIED REGISTERED

## 2024-06-10 PROCEDURE — 25810000003 SODIUM CHLORIDE 0.9 % SOLUTION: Performed by: ORTHOPAEDIC SURGERY

## 2024-06-10 PROCEDURE — 25010000002 FENTANYL CITRATE (PF) 50 MCG/ML SOLUTION: Performed by: NURSE ANESTHETIST, CERTIFIED REGISTERED

## 2024-06-10 PROCEDURE — 25010000002 ROPIVACAINE PER 1 MG: Performed by: ORTHOPAEDIC SURGERY

## 2024-06-10 PROCEDURE — 25810000003 LACTATED RINGERS PER 1000 ML: Performed by: ANESTHESIOLOGY

## 2024-06-10 PROCEDURE — 0SR9069 REPLACEMENT OF RIGHT HIP JOINT WITH OXIDIZED ZIRCONIUM ON POLYETHYLENE SYNTHETIC SUBSTITUTE, CEMENTED, OPEN APPROACH: ICD-10-PCS | Performed by: ORTHOPAEDIC SURGERY

## 2024-06-10 PROCEDURE — 80048 BASIC METABOLIC PNL TOTAL CA: CPT | Performed by: ORTHOPAEDIC SURGERY

## 2024-06-10 DEVICE — IMPLANTABLE DEVICE: Type: IMPLANTABLE DEVICE | Status: FUNCTIONAL

## 2024-06-10 DEVICE — R3 3 HOLE ACETABULAR SHELL 56MM
Type: IMPLANTABLE DEVICE | Site: HIP | Status: FUNCTIONAL
Brand: R3 ACETABULAR

## 2024-06-10 DEVICE — POLARSTEM LATERAL CEMENTED 4
Type: IMPLANTABLE DEVICE | Site: HIP | Status: FUNCTIONAL
Brand: POLARSTEM

## 2024-06-10 DEVICE — OR3O DUAL MOBILITY LINER 44/56
Type: IMPLANTABLE DEVICE | Site: HIP | Status: FUNCTIONAL
Brand: OR3O DUAL MOBILITY

## 2024-06-10 DEVICE — OXINIUM FEMORAL HEAD 12/14 TAPER                                    28 MM +4
Type: IMPLANTABLE DEVICE | Site: HIP | Status: FUNCTIONAL
Brand: OXINIUM

## 2024-06-10 DEVICE — PALACOS® R IS A FAST-CURING, RADIOPAQUE, POLY(METHYL METHACRYLATE)-BASED BONE CEMENT.PALACOS ® R CONTAINS THE X-RAY CONTRAST MEDIUM ZIRCONIUM DIOXIDE. TO IMPROVE VISIBILITY IN THE SURGICAL FIELD PALACOS ® R HAS BEEN COLOURED WITH CHLOROPHYLL (E141). THE BONE CEMENT IS PREPARED DIRECTLY BEFORE USE BY MIXING A POLYMER POWDER COMPONENT WITH A LIQUID MONOMER COMPONENT. A DUCTILE DOUGH FORMS WHICH CURES WITHIN A FEW MINUTES.
Type: IMPLANTABLE DEVICE | Site: HIP | Status: FUNCTIONAL
Brand: PALACOS®

## 2024-06-10 DEVICE — OR3O DUAL MOBILITY XLPE INSERT 28/44
Type: IMPLANTABLE DEVICE | Site: HIP | Status: FUNCTIONAL
Brand: OR3O DUAL MOBILITY

## 2024-06-10 RX ORDER — TRANEXAMIC ACID 100 MG/ML
INJECTION, SOLUTION INTRAVENOUS AS NEEDED
Status: DISCONTINUED | OUTPATIENT
Start: 2024-06-10 | End: 2024-06-10 | Stop reason: SURG

## 2024-06-10 RX ORDER — PROMETHAZINE HYDROCHLORIDE 25 MG/1
25 TABLET ORAL ONCE AS NEEDED
Status: DISCONTINUED | OUTPATIENT
Start: 2024-06-10 | End: 2024-06-10 | Stop reason: HOSPADM

## 2024-06-10 RX ORDER — FENTANYL CITRATE 50 UG/ML
INJECTION, SOLUTION INTRAMUSCULAR; INTRAVENOUS AS NEEDED
Status: DISCONTINUED | OUTPATIENT
Start: 2024-06-10 | End: 2024-06-10 | Stop reason: SURG

## 2024-06-10 RX ORDER — FENTANYL CITRATE 50 UG/ML
25 INJECTION, SOLUTION INTRAMUSCULAR; INTRAVENOUS
Status: DISCONTINUED | OUTPATIENT
Start: 2024-06-10 | End: 2024-06-10 | Stop reason: HOSPADM

## 2024-06-10 RX ORDER — SODIUM CHLORIDE 0.9 % (FLUSH) 0.9 %
3-10 SYRINGE (ML) INJECTION AS NEEDED
Status: DISCONTINUED | OUTPATIENT
Start: 2024-06-10 | End: 2024-06-10 | Stop reason: HOSPADM

## 2024-06-10 RX ORDER — ASPIRIN 81 MG/1
81 TABLET ORAL EVERY 12 HOURS SCHEDULED
Status: DISCONTINUED | OUTPATIENT
Start: 2024-06-11 | End: 2024-06-13 | Stop reason: HOSPADM

## 2024-06-10 RX ORDER — ASPIRIN 81 MG/1
81 TABLET ORAL EVERY 12 HOURS
Qty: 60 TABLET | Refills: 0 | Status: SHIPPED | OUTPATIENT
Start: 2024-06-10 | End: 2024-07-12

## 2024-06-10 RX ORDER — SODIUM CHLORIDE 9 MG/ML
100 INJECTION, SOLUTION INTRAVENOUS CONTINUOUS
Status: DISCONTINUED | OUTPATIENT
Start: 2024-06-10 | End: 2024-06-13 | Stop reason: HOSPADM

## 2024-06-10 RX ORDER — FAMOTIDINE 20 MG/1
40 TABLET, FILM COATED ORAL DAILY
Status: DISCONTINUED | OUTPATIENT
Start: 2024-06-10 | End: 2024-06-13 | Stop reason: HOSPADM

## 2024-06-10 RX ORDER — LIDOCAINE HYDROCHLORIDE 20 MG/ML
INJECTION, SOLUTION INFILTRATION; PERINEURAL AS NEEDED
Status: DISCONTINUED | OUTPATIENT
Start: 2024-06-10 | End: 2024-06-10 | Stop reason: SURG

## 2024-06-10 RX ORDER — ROCURONIUM BROMIDE 10 MG/ML
INJECTION, SOLUTION INTRAVENOUS AS NEEDED
Status: DISCONTINUED | OUTPATIENT
Start: 2024-06-10 | End: 2024-06-10 | Stop reason: SURG

## 2024-06-10 RX ORDER — PROPOFOL 10 MG/ML
VIAL (ML) INTRAVENOUS AS NEEDED
Status: DISCONTINUED | OUTPATIENT
Start: 2024-06-10 | End: 2024-06-10 | Stop reason: SURG

## 2024-06-10 RX ORDER — CARVEDILOL 6.25 MG/1
6.25 TABLET ORAL 2 TIMES DAILY
Status: DISCONTINUED | OUTPATIENT
Start: 2024-06-10 | End: 2024-06-13 | Stop reason: HOSPADM

## 2024-06-10 RX ORDER — SODIUM CHLORIDE 0.9 % (FLUSH) 0.9 %
3 SYRINGE (ML) INJECTION EVERY 12 HOURS SCHEDULED
Status: DISCONTINUED | OUTPATIENT
Start: 2024-06-10 | End: 2024-06-10 | Stop reason: HOSPADM

## 2024-06-10 RX ORDER — ONDANSETRON 4 MG/1
4 TABLET, ORALLY DISINTEGRATING ORAL EVERY 6 HOURS PRN
Status: DISCONTINUED | OUTPATIENT
Start: 2024-06-10 | End: 2024-06-13 | Stop reason: HOSPADM

## 2024-06-10 RX ORDER — ONDANSETRON 2 MG/ML
4 INJECTION INTRAMUSCULAR; INTRAVENOUS ONCE AS NEEDED
Status: DISCONTINUED | OUTPATIENT
Start: 2024-06-10 | End: 2024-06-10 | Stop reason: HOSPADM

## 2024-06-10 RX ORDER — MELOXICAM 15 MG/1
15 TABLET ORAL DAILY
Status: DISCONTINUED | OUTPATIENT
Start: 2024-06-10 | End: 2024-06-13 | Stop reason: HOSPADM

## 2024-06-10 RX ORDER — LIDOCAINE HYDROCHLORIDE 10 MG/ML
0.5 INJECTION, SOLUTION INFILTRATION; PERINEURAL ONCE AS NEEDED
Status: DISCONTINUED | OUTPATIENT
Start: 2024-06-10 | End: 2024-06-10 | Stop reason: HOSPADM

## 2024-06-10 RX ORDER — CELECOXIB 200 MG/1
200 CAPSULE ORAL ONCE
Status: COMPLETED | OUTPATIENT
Start: 2024-06-10 | End: 2024-06-10

## 2024-06-10 RX ORDER — NALOXONE HCL 0.4 MG/ML
0.2 VIAL (ML) INJECTION AS NEEDED
Status: DISCONTINUED | OUTPATIENT
Start: 2024-06-10 | End: 2024-06-10 | Stop reason: HOSPADM

## 2024-06-10 RX ORDER — FLUMAZENIL 0.1 MG/ML
0.2 INJECTION INTRAVENOUS AS NEEDED
Status: DISCONTINUED | OUTPATIENT
Start: 2024-06-10 | End: 2024-06-10 | Stop reason: HOSPADM

## 2024-06-10 RX ORDER — ONDANSETRON 2 MG/ML
INJECTION INTRAMUSCULAR; INTRAVENOUS AS NEEDED
Status: DISCONTINUED | OUTPATIENT
Start: 2024-06-10 | End: 2024-06-10 | Stop reason: SURG

## 2024-06-10 RX ORDER — FAMOTIDINE 10 MG/ML
20 INJECTION, SOLUTION INTRAVENOUS ONCE
Status: COMPLETED | OUTPATIENT
Start: 2024-06-10 | End: 2024-06-10

## 2024-06-10 RX ORDER — DROPERIDOL 2.5 MG/ML
0.62 INJECTION, SOLUTION INTRAMUSCULAR; INTRAVENOUS
Status: DISCONTINUED | OUTPATIENT
Start: 2024-06-10 | End: 2024-06-10 | Stop reason: HOSPADM

## 2024-06-10 RX ORDER — OXYCODONE AND ACETAMINOPHEN 5; 325 MG/1; MG/1
2 TABLET ORAL EVERY 4 HOURS PRN
Status: DISCONTINUED | OUTPATIENT
Start: 2024-06-10 | End: 2024-06-13 | Stop reason: HOSPADM

## 2024-06-10 RX ORDER — VANCOMYCIN HYDROCHLORIDE 1 G/20ML
INJECTION, POWDER, LYOPHILIZED, FOR SOLUTION INTRAVENOUS AS NEEDED
Status: DISCONTINUED | OUTPATIENT
Start: 2024-06-10 | End: 2024-06-10 | Stop reason: HOSPADM

## 2024-06-10 RX ORDER — PROMETHAZINE HYDROCHLORIDE 25 MG/1
25 SUPPOSITORY RECTAL ONCE AS NEEDED
Status: DISCONTINUED | OUTPATIENT
Start: 2024-06-10 | End: 2024-06-10 | Stop reason: HOSPADM

## 2024-06-10 RX ORDER — HYDRALAZINE HYDROCHLORIDE 20 MG/ML
5 INJECTION INTRAMUSCULAR; INTRAVENOUS
Status: DISCONTINUED | OUTPATIENT
Start: 2024-06-10 | End: 2024-06-10 | Stop reason: HOSPADM

## 2024-06-10 RX ORDER — OXYCODONE AND ACETAMINOPHEN 5; 325 MG/1; MG/1
1 TABLET ORAL EVERY 4 HOURS PRN
Status: DISCONTINUED | OUTPATIENT
Start: 2024-06-10 | End: 2024-06-13 | Stop reason: HOSPADM

## 2024-06-10 RX ORDER — DOCUSATE SODIUM 100 MG/1
100 CAPSULE, LIQUID FILLED ORAL 2 TIMES DAILY PRN
Status: DISCONTINUED | OUTPATIENT
Start: 2024-06-10 | End: 2024-06-13 | Stop reason: HOSPADM

## 2024-06-10 RX ORDER — LABETALOL HYDROCHLORIDE 5 MG/ML
5 INJECTION, SOLUTION INTRAVENOUS
Status: DISCONTINUED | OUTPATIENT
Start: 2024-06-10 | End: 2024-06-10 | Stop reason: HOSPADM

## 2024-06-10 RX ORDER — DEXAMETHASONE SODIUM PHOSPHATE 4 MG/ML
INJECTION, SOLUTION INTRA-ARTICULAR; INTRALESIONAL; INTRAMUSCULAR; INTRAVENOUS; SOFT TISSUE AS NEEDED
Status: DISCONTINUED | OUTPATIENT
Start: 2024-06-10 | End: 2024-06-10 | Stop reason: SURG

## 2024-06-10 RX ORDER — HYDROCODONE BITARTRATE AND ACETAMINOPHEN 7.5; 325 MG/1; MG/1
1 TABLET ORAL EVERY 4 HOURS PRN
Status: DISCONTINUED | OUTPATIENT
Start: 2024-06-10 | End: 2024-06-10 | Stop reason: HOSPADM

## 2024-06-10 RX ORDER — NALOXONE HCL 0.4 MG/ML
0.1 VIAL (ML) INJECTION
Status: DISCONTINUED | OUTPATIENT
Start: 2024-06-10 | End: 2024-06-13 | Stop reason: HOSPADM

## 2024-06-10 RX ORDER — PREGABALIN 75 MG/1
150 CAPSULE ORAL ONCE
Status: COMPLETED | OUTPATIENT
Start: 2024-06-10 | End: 2024-06-10

## 2024-06-10 RX ORDER — EPHEDRINE SULFATE 50 MG/ML
5 INJECTION, SOLUTION INTRAVENOUS ONCE AS NEEDED
Status: DISCONTINUED | OUTPATIENT
Start: 2024-06-10 | End: 2024-06-10 | Stop reason: HOSPADM

## 2024-06-10 RX ORDER — OXYCODONE AND ACETAMINOPHEN 5; 325 MG/1; MG/1
1 TABLET ORAL EVERY 4 HOURS PRN
Qty: 30 TABLET | Refills: 0 | Status: SHIPPED | OUTPATIENT
Start: 2024-06-10

## 2024-06-10 RX ORDER — HYDROMORPHONE HYDROCHLORIDE 1 MG/ML
0.25 INJECTION, SOLUTION INTRAMUSCULAR; INTRAVENOUS; SUBCUTANEOUS
Status: DISCONTINUED | OUTPATIENT
Start: 2024-06-10 | End: 2024-06-10 | Stop reason: HOSPADM

## 2024-06-10 RX ORDER — SODIUM CHLORIDE, SODIUM LACTATE, POTASSIUM CHLORIDE, CALCIUM CHLORIDE 600; 310; 30; 20 MG/100ML; MG/100ML; MG/100ML; MG/100ML
9 INJECTION, SOLUTION INTRAVENOUS CONTINUOUS
Status: DISCONTINUED | OUTPATIENT
Start: 2024-06-10 | End: 2024-06-13 | Stop reason: HOSPADM

## 2024-06-10 RX ORDER — ONDANSETRON 2 MG/ML
4 INJECTION INTRAMUSCULAR; INTRAVENOUS EVERY 6 HOURS PRN
Status: DISCONTINUED | OUTPATIENT
Start: 2024-06-10 | End: 2024-06-13 | Stop reason: HOSPADM

## 2024-06-10 RX ORDER — ACETAMINOPHEN 500 MG
1000 TABLET ORAL ONCE
Status: COMPLETED | OUTPATIENT
Start: 2024-06-10 | End: 2024-06-10

## 2024-06-10 RX ORDER — CEFAZOLIN SODIUM 1 G/3ML
INJECTION, POWDER, FOR SOLUTION INTRAMUSCULAR; INTRAVENOUS AS NEEDED
Status: DISCONTINUED | OUTPATIENT
Start: 2024-06-10 | End: 2024-06-10 | Stop reason: HOSPADM

## 2024-06-10 RX ORDER — DIPHENHYDRAMINE HYDROCHLORIDE 50 MG/ML
12.5 INJECTION INTRAMUSCULAR; INTRAVENOUS
Status: DISCONTINUED | OUTPATIENT
Start: 2024-06-10 | End: 2024-06-10 | Stop reason: HOSPADM

## 2024-06-10 RX ORDER — ONDANSETRON 4 MG/1
4 TABLET, FILM COATED ORAL EVERY 6 HOURS PRN
Qty: 30 TABLET | Refills: 0 | Status: SHIPPED | OUTPATIENT
Start: 2024-06-10

## 2024-06-10 RX ORDER — IPRATROPIUM BROMIDE AND ALBUTEROL SULFATE 2.5; .5 MG/3ML; MG/3ML
3 SOLUTION RESPIRATORY (INHALATION) ONCE AS NEEDED
Status: DISCONTINUED | OUTPATIENT
Start: 2024-06-10 | End: 2024-06-10 | Stop reason: HOSPADM

## 2024-06-10 RX ORDER — HYDROCODONE BITARTRATE AND ACETAMINOPHEN 5; 325 MG/1; MG/1
1 TABLET ORAL ONCE AS NEEDED
Status: COMPLETED | OUTPATIENT
Start: 2024-06-10 | End: 2024-06-10

## 2024-06-10 RX ADMIN — FENTANYL CITRATE 25 MCG: 50 INJECTION, SOLUTION INTRAMUSCULAR; INTRAVENOUS at 13:33

## 2024-06-10 RX ADMIN — SODIUM CHLORIDE 2000 MG: 900 INJECTION INTRAVENOUS at 12:47

## 2024-06-10 RX ADMIN — PREGABALIN 75 MG: 75 CAPSULE ORAL at 11:35

## 2024-06-10 RX ADMIN — SODIUM CHLORIDE, POTASSIUM CHLORIDE, SODIUM LACTATE AND CALCIUM CHLORIDE 9 ML/HR: 600; 310; 30; 20 INJECTION, SOLUTION INTRAVENOUS at 15:10

## 2024-06-10 RX ADMIN — TRANEXAMIC ACID 1000 MG: 100 INJECTION, SOLUTION INTRAVENOUS at 13:08

## 2024-06-10 RX ADMIN — HYDROCODONE BITARTRATE AND ACETAMINOPHEN 1 TABLET: 5; 325 TABLET ORAL at 14:58

## 2024-06-10 RX ADMIN — TRANEXAMIC ACID 1000 MG: 100 INJECTION, SOLUTION INTRAVENOUS at 14:02

## 2024-06-10 RX ADMIN — ACETAMINOPHEN 1000 MG: 500 TABLET ORAL at 11:35

## 2024-06-10 RX ADMIN — PROPOFOL 150 MG: 10 INJECTION, EMULSION INTRAVENOUS at 12:55

## 2024-06-10 RX ADMIN — HYDRALAZINE HYDROCHLORIDE 5 MG: 20 INJECTION, SOLUTION INTRAMUSCULAR; INTRAVENOUS at 14:33

## 2024-06-10 RX ADMIN — FAMOTIDINE 40 MG: 20 TABLET, FILM COATED ORAL at 17:36

## 2024-06-10 RX ADMIN — ROCURONIUM BROMIDE 50 MG: 10 INJECTION, SOLUTION INTRAVENOUS at 12:55

## 2024-06-10 RX ADMIN — Medication 3 ML: at 12:23

## 2024-06-10 RX ADMIN — ONDANSETRON 4 MG: 2 INJECTION INTRAMUSCULAR; INTRAVENOUS at 13:57

## 2024-06-10 RX ADMIN — CELECOXIB 200 MG: 200 CAPSULE ORAL at 11:35

## 2024-06-10 RX ADMIN — LIDOCAINE HYDROCHLORIDE 60 MG: 20 INJECTION, SOLUTION INFILTRATION; PERINEURAL at 12:55

## 2024-06-10 RX ADMIN — SUGAMMADEX 150 MG: 100 INJECTION, SOLUTION INTRAVENOUS at 14:07

## 2024-06-10 RX ADMIN — DEXAMETHASONE SODIUM PHOSPHATE 8 MG: 4 INJECTION, SOLUTION INTRA-ARTICULAR; INTRALESIONAL; INTRAMUSCULAR; INTRAVENOUS; SOFT TISSUE at 12:55

## 2024-06-10 RX ADMIN — FAMOTIDINE 20 MG: 10 INJECTION INTRAVENOUS at 12:23

## 2024-06-10 RX ADMIN — SODIUM CHLORIDE 2000 MG: 900 INJECTION INTRAVENOUS at 21:17

## 2024-06-10 RX ADMIN — SODIUM CHLORIDE, POTASSIUM CHLORIDE, SODIUM LACTATE AND CALCIUM CHLORIDE 9 ML/HR: 600; 310; 30; 20 INJECTION, SOLUTION INTRAVENOUS at 12:23

## 2024-06-10 RX ADMIN — FENTANYL CITRATE 25 MCG: 50 INJECTION, SOLUTION INTRAMUSCULAR; INTRAVENOUS at 13:18

## 2024-06-10 RX ADMIN — CARVEDILOL 6.25 MG: 6.25 TABLET, FILM COATED ORAL at 21:16

## 2024-06-10 RX ADMIN — MELOXICAM 15 MG: 15 TABLET ORAL at 17:36

## 2024-06-10 RX ADMIN — SODIUM CHLORIDE 100 ML/HR: 9 INJECTION, SOLUTION INTRAVENOUS at 17:36

## 2024-06-10 NOTE — PLAN OF CARE
Goal Outcome Evaluation:   Patient is POD 0. Aox4. VSS on RA. Patient was unable to ambulate on arrival to unit, became very lightheaded when standing up from the stretcher and almost passed out. Recommend assist x2 when attempting to ambulate again. ADELITA dressing is CDI, flashing green. Has voided since surgery. Pain is adequately controlled with PRN oral medication. Plan at discharge is home with family support, possibly tomorrow. All needs currently met, will continue to monitor.

## 2024-06-10 NOTE — ANESTHESIA PREPROCEDURE EVALUATION
Anesthesia Evaluation     Patient summary reviewed and Nursing notes reviewed   no history of anesthetic complications:   NPO Solid Status: > 8 hours  NPO Liquid Status: > 2 hours           Airway   Mallampati: I  TM distance: >3 FB  Neck ROM: full  No difficulty expected  Dental    (+) poor dentition        Pulmonary - normal exam   (+) a smoker Former,  Cardiovascular - normal exam  Exercise tolerance: good (4-7 METS)    ECG reviewed  Patient on routine beta blocker and Beta blocker given within 24 hours of surgery    (+) hypertension (just recently started coreg, patient reports BP runs 140-190s at home), dysrhythmias (RBBB)  (-) angina, orthopnea, PND, DANIELLE      Neuro/Psych  GI/Hepatic/Renal/Endo    (+) GERD, PUD (20s)    Musculoskeletal     Abdominal    Substance History      OB/GYN          Other   arthritis, blood dyscrasia (11.0) anemia,     ROS/Med Hx Other: [place]    Bikes 2 hours each day                 Anesthesia Plan    ASA 2     general     (I have reviewed the patient's history and chart with the patient, including all pertinent laboratory results and imaging. I have explained the risks of anesthesia including but not limited to dental damage, corneal abrasion, nerve injury, MI, stroke, aspiration, and death. Patient has agreed to proceed.          )  intravenous induction     Anesthetic plan, risks, benefits, and alternatives have been provided, discussed and informed consent has been obtained with: patient.    CODE STATUS:

## 2024-06-10 NOTE — ANESTHESIA POSTPROCEDURE EVALUATION
Patient: Chacho Martinez    Procedure Summary       Date: 06/10/24 Room / Location:  [facility]  [facility]    Anesthesia Start: 1252 Anesthesia Stop: 1417    Procedure: RIGHT TOTAL HIP ARTHROPLASTY CONVERSION (Right: Hip) Diagnosis:     Surgeons: Mio Roldan II, MD Provider: Cee Méndez MD    Anesthesia Type: general ASA Status: 2            Anesthesia Type: general    Vitals  Vitals Value Taken Time   /64 06/10/24 1545   Temp 36.4 °C (97.5 °F) 06/10/24 1545   Pulse 54 06/10/24 1557   Resp 14 06/10/24 1545   SpO2 96 % 06/10/24 1557   Vitals shown include unfiled device data.        Post Anesthesia Care and Evaluation    Patient location during evaluation: bedside  Patient participation: complete - patient participated  Level of consciousness: awake and alert  Pain management: adequate    Airway patency: patent  Anesthetic complications: No anesthetic complications    Cardiovascular status: acceptable  Respiratory status: acceptable  Hydration status: acceptable    Comments: /62 (BP Location: Left arm, Patient Position: Lying)   Pulse 55   Temp 36.5 °C (97.7 °F) (Oral)   Resp 14   SpO2 96%

## 2024-06-10 NOTE — ANESTHESIA PROCEDURE NOTES
Airway  Urgency: elective    Date/Time: 6/10/2024 12:58 PM  Airway not difficult    General Information and Staff    Patient location during procedure: OR  Anesthesiologist: Cee Méndez MD  CRNA/CAA: Peace Diaz CRNA    Indications and Patient Condition  Indications for airway management: airway protection    Preoxygenated: yes  Mask difficulty assessment: 2 - vent by mask + OA or adjuvant +/- NMBA    Final Airway Details  Final airway type: endotracheal airway      Successful airway: ETT  Cuffed: yes   Successful intubation technique: direct laryngoscopy  Facilitating devices/methods: intubating stylet  Endotracheal tube insertion site: oral  Blade: Shanta  Blade size: 4  ETT size (mm): 7.5  Cormack-Lehane Classification: grade IIa - partial view of glottis  Placement verified by: chest auscultation and capnometry   Cuff volume (mL): 6  Measured from: teeth  ETT/EBT  to teeth (cm): 23  Number of attempts at approach: 1  Assessment: lips, teeth, and gum same as pre-op and atraumatic intubation    Additional Comments  Dentition same as preop

## 2024-06-10 NOTE — OP NOTE
Anterior Cemented Total Hip Operative Note  Dr. VIRAL Vealrde” Jamar II  [phone]    PATIENT NAME: Chacho Martinez  MRN: [id number]  : 1945 AGE: 79 y.o. 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 This case would not have been possible without another set of skilled surgical hands for retraction, use of instrumentation, and general assistance.  This assistance was vital to the success of the case.   ESTIMATED BLOOD LOSS: 300cc  SPONGE AND NEEDLE COUNT: Correct  INDICATIONS:   This patient had a previous hip fracture with 3 retained femoral neck screws.  His femoral head went on to avascular necrosis with advanced collapse.  A discussion of operative versus nonoperative treatment was had. They elected to undergo anterior total hip arthroplasty. The risks of surgery were discussed and included the risk of anesthesia, infection, damage to neurovascular structures, implant loosening/failure, fracture, hardware prominence, dislocation, the need for further procedures, medical complications, and others. No guarantees were made. The patient wished to proceed with surgery and a surgical consent was signed.  COMPONENTS:   Acetabular Cup: Smith [employer] Nephew R3 acetabular cup: 56 Outer Diameter  Cup Screws: Smith & Nephew 6.5 mm screws: No Screws Were Used  Smith [employer] Nephew Neutral Acetabular Liner: Neutral  Smith [employer] Nephew Polar Cemented Stem: Size 4  Smith & Nephew Oxinium Head: Dual Mobilitymm +4  2 packs  cement    PERTINENT FINDINGS: AVN: Avascular necrosis of the femoral head.    DETAILS OF PROCEDURE:   The patient was met in the preoperative area. The site was marked. The consent and H&P were reviewed. The patient was then wheeled back to the operative suite underwent anesthesia. The [place] table boots were secured to the patients’ feet. The patient was moved onto the [place] table and secured in the supine position. The perineal post was inserted and the boots were secured into the leg holders. Surgical alcohol was used to thoroughly clean the operative area.     The hip and leg was then prepped in the normal sterile fashion, multiple layers of sterile drapes, and surgical space suits for the entire operative team. New outer gloves were used by all sterile surgical team members after final draping. The surgical incision was marked. A surgical timeout was performed.    A Modified Crow-Krishna anterior approach was used. Dissection was carried down to the fascia. The fascia was incised and the tensor fascia sharif muscle was retracted laterally and the sartorius medially. The lateral femoral circumflex vessels were identified and cauterized using bovie. The rectus femoris was retracted medially. A capsulotomy was then performed. The capsule was tagged with Ethibond for later repair. Retractors were placed on either side of the femoral neck and dissection was further carried down so that the lesser trochanter could be palpated and the superior rim of the acetabulum could be visualized.    The femoral neck cut was then made from  just proximal to the lesser trochanter medially headed towards the saddle laterally. Care was taken not to extend the cut into the lesser or greater trochanters. The head and neck segment were removed with a corkscrew. The acetabulum was then exposed. The labrum was removed using a kocher and scalpel and excess osteophytes were removed using an osteotome.    The acetabulum was progressively  reamed, beginning with medialization and then finalizing the position of the reamer to approximate the final cup position. Fluoroscopy was used to ensure proper placement of the reamer, including adequate medialization as well as appropriate abduction and anteversion. The real cup was then opened and inserted using fluoroscopy, ensuring good position in terms of abduction and anteversion.     No Screws: Initial press-fit fixation of the cup was very robust and no screws were required for supplemental fixation.    After thorough irrigation and ensuring that no soft tissue was entrapped within the cup, the real liner was snapped into place.    The hook was then placed just distal to the lesser trochanter.  We began externally rotated the femur but even with some gentle external rotation by the [place] table assistant there was a fracture of the greater trochanter at the level of the previous cannulated screws.  Further external rotation was performed along with some soft tissue releases.  The femur was exposed and I did not even need to use a mallet to broach the femur.  We then trialed the hip and leg length and offset were assessed.  I assessed the fracture and decided I would reduce it utilizing the cement.  Real implants were opened while cement was mixed.  A canal restrictor was used in the canal.  The canal was thoroughly lavaged and cleaned and then cement was pressurized.  The real stem and head ball were opened and assembled and inserted into the femoral shaft.  The hip was reduced and x-rays demonstrated good reduction of the greater trochanteric piece.  I did use a little bit more cement around the shoulder of the prosthetic to ensure good fixation of the greater trochanter.  I did consider utilizing a plate but was worried about further prosthetic burden and the risk of infection.  Also following the cement gave us plenty of stability.  Final x-rays demonstrated appropriate position of all hardware with no  "complications other than the greater trochanter fracture.    An analgesic cocktail was then injected about the hip as well as the surgical dissection area. The capsule was closed.  The deep fascia was closed with a running stitch.  The skin was closed in layers and a sterile dressing was applied.    The patient was moved from the [place] table to the [place] where the boots were removed. The patient was taken to the recovery room in stable condition. There were no complications and the patient tolerated the procedure well.    R \"Bakari\" Jamar TAYLOR MD  Orthopaedic Surgery  [facility]  [phone]              "

## 2024-06-10 NOTE — H&P
"  Orthopaedic Surgery  History & Physical For Elective Total Hip  Dr. VIRAL Velarde” Jamar II  [phone]    HPI:  Patient is a 79 y.o. Not  or  male who presents with End-stage avascular necrosis of the right hip.  They failed conservative treatment of their hip pain and a thorough discussion of the risks and benefits of surgery was had.  The patient wishes to continue with elective total hip replacement, they were scheduled and are here for surgery. They did get medical clearance as well as a thorough preoperative workup.     MEDICAL HISTORY  Past Medical History:   Diagnosis Date    Arthritis     Atherosclerosis     Avascular necrosis     RIGHT HIP    GERD (gastroesophageal reflux disease)     Heart valve disorder     \"FLOPPY\"    History of gastric ulcer     AGE 20'S    [place] (hard of hearing)     Hypertension     Right hip pain     Scab     LEFT SHIN     Past Surgical History:   Procedure Laterality Date    FEMUR FRACTURE SURGERY Right 2002    3 SCREWS    FEMUR FRACTURE SURGERY Left 2004    \"BALL AND SHAFT\"    HERNIA REPAIR  2003    WITH MESH, INGUINAL AND UMBILICAL     Prior to Admission medications    Medication Sig Start Date End Date Taking? Authorizing Provider   Calcium Carbonate-Vitamin D 250-3.125 MG-MCG tablet Take 1 tablet by mouth Daily.    Provider, MD Jaimie   carvedilol (COREG) 6.25 MG tablet Take 1 tablet by mouth 2 (Two) Times a Day.  Patient taking differently: Take 1 tablet by mouth 2 (Two) Times a Day. PT GETTING FILLED TODAY, NEW PRESCRIPTION 6/5/24   Gen Vasquez MD   multivitamin with minerals (Centrum Silver 50+Men) tablet tablet Take 1 tablet by mouth Daily.    Provider, MD Jaimie     No Known Allergies    There is no immunization history on file for this patient.  Social History     Tobacco Use    Smoking status: Former     Types: Cigarettes     Passive exposure: Never    Smokeless tobacco: Never    Tobacco comments:     QUIT AGE 30'S   Substance Use Topics    " Alcohol use: Never      Social History     Substance and Sexual Activity   Drug Use Never       REVIEW OF SYSTEMS:  Head: negative for headache  Respiratory: negative for shortness of breath.   Cardiovascular: negative for chest pain.   Gastrointestinal: negative abdominal pain.   Neurological: negative for LOC  Psychiatric/Behavioral: negative for memory loss.   All other systems reviewed and are negative    VITALS: There were no vitals taken for this visit. There is no height or weight on file to calculate BMI.    PHYSICAL EXAM:   CONSTITUTIONAL: A&Ox3, No acute distress  LUNGS: Equal chest rise, no shortness of air  CARDIOVASCULAR: palpable peripheral pulses  SKIN: no skin lesions in the area examined  LYMPH: no lymphadenopathy in the area examined  EXTREMITY: Hip  Pulses:  Brisk Capillary Refill  Sensation: Intact to Saphenous, Sural, Deep Peroneal, Superficial Peroneal, and Tibial Nerves and grossly throughout extremity  Motor: 5/5 EHL/FHL/TA/GS motor complexes    RADIOLOGY REVIEW:   No radiology results for the last 7 days    LABS:   Results for the past 24 hours: No results found for this or any previous visit (from the past 24 hour(s)).    IMPRESSION:  Patient is a 79 y.o. Not  or  male with end-stage osteoarthritis of the right hip    PLAN:   Surgery: Elective total hip arthroplasty  Consent: The risks and benefits of operative versus nonoperative treatment were discussed. The patient elected to undergo operative treatment of their hip. The risks discussed included but were not limited to blood clots, MI, stroke, other medical complications, infection, dislocation, fracture, damage to neurovascular structures, continued pain, hardware prominence, loss of range of motion, stiffness, need for further procedures, and and risk of anesthesia. No guarantees were made   Disposition: Elective right Total Hip Arthroplasty today.    Mio Roldan II, MD  Orthopaedic Surgery  [facility]

## 2024-06-11 PROBLEM — M16.9 OSTEOARTHRITIS OF HIP: Status: ACTIVE | Noted: 2024-06-11

## 2024-06-11 LAB
HCT VFR BLD AUTO: 22.3 % (ref 37.5–51)
HGB BLD-MCNC: 7.7 G/DL (ref 13–17.7)

## 2024-06-11 PROCEDURE — A9270 NON-COVERED ITEM OR SERVICE: HCPCS | Performed by: ORTHOPAEDIC SURGERY

## 2024-06-11 PROCEDURE — 63710000001 SENNOSIDES-DOCUSATE 8.6-50 MG TABLET: Performed by: ORTHOPAEDIC SURGERY

## 2024-06-11 PROCEDURE — 97116 GAIT TRAINING THERAPY: CPT

## 2024-06-11 PROCEDURE — 97530 THERAPEUTIC ACTIVITIES: CPT

## 2024-06-11 PROCEDURE — 63710000001 ASPIRIN 81 MG TABLET DELAYED-RELEASE: Performed by: ORTHOPAEDIC SURGERY

## 2024-06-11 PROCEDURE — 85014 HEMATOCRIT: CPT | Performed by: ORTHOPAEDIC SURGERY

## 2024-06-11 PROCEDURE — 25010000002 CEFAZOLIN PER 500 MG: Performed by: ORTHOPAEDIC SURGERY

## 2024-06-11 PROCEDURE — 85018 HEMOGLOBIN: CPT | Performed by: ORTHOPAEDIC SURGERY

## 2024-06-11 PROCEDURE — 97162 PT EVAL MOD COMPLEX 30 MIN: CPT

## 2024-06-11 PROCEDURE — 63710000001 CARVEDILOL 6.25 MG TABLET: Performed by: ORTHOPAEDIC SURGERY

## 2024-06-11 RX ORDER — BISACODYL 10 MG
10 SUPPOSITORY, RECTAL RECTAL DAILY PRN
Status: DISCONTINUED | OUTPATIENT
Start: 2024-06-11 | End: 2024-06-13 | Stop reason: HOSPADM

## 2024-06-11 RX ORDER — POLYETHYLENE GLYCOL 3350 17 G/17G
17 POWDER, FOR SOLUTION ORAL DAILY PRN
Status: DISCONTINUED | OUTPATIENT
Start: 2024-06-11 | End: 2024-06-13 | Stop reason: HOSPADM

## 2024-06-11 RX ORDER — BISACODYL 5 MG/1
5 TABLET, DELAYED RELEASE ORAL DAILY PRN
Status: DISCONTINUED | OUTPATIENT
Start: 2024-06-11 | End: 2024-06-13 | Stop reason: HOSPADM

## 2024-06-11 RX ORDER — AMOXICILLIN 250 MG
2 CAPSULE ORAL 2 TIMES DAILY PRN
Status: DISCONTINUED | OUTPATIENT
Start: 2024-06-11 | End: 2024-06-13 | Stop reason: HOSPADM

## 2024-06-11 RX ADMIN — CARVEDILOL 6.25 MG: 6.25 TABLET, FILM COATED ORAL at 21:12

## 2024-06-11 RX ADMIN — OXYCODONE AND ACETAMINOPHEN 1 TABLET: 5; 325 TABLET ORAL at 08:28

## 2024-06-11 RX ADMIN — SODIUM CHLORIDE 2000 MG: 900 INJECTION INTRAVENOUS at 05:03

## 2024-06-11 RX ADMIN — ASPIRIN 81 MG: 81 TABLET, COATED ORAL at 08:28

## 2024-06-11 RX ADMIN — SODIUM CHLORIDE 100 ML/HR: 9 INJECTION, SOLUTION INTRAVENOUS at 05:04

## 2024-06-11 RX ADMIN — FAMOTIDINE 40 MG: 20 TABLET, FILM COATED ORAL at 08:28

## 2024-06-11 RX ADMIN — CARVEDILOL 6.25 MG: 6.25 TABLET, FILM COATED ORAL at 08:28

## 2024-06-11 RX ADMIN — SENNOSIDES AND DOCUSATE SODIUM 2 TABLET: 50; 8.6 TABLET ORAL at 21:12

## 2024-06-11 RX ADMIN — ASPIRIN 81 MG: 81 TABLET, COATED ORAL at 21:12

## 2024-06-11 RX ADMIN — MELOXICAM 15 MG: 15 TABLET ORAL at 08:28

## 2024-06-11 NOTE — THERAPY EVALUATION
"Patient Name: Chacho Martinez  : 1945    MRN: [id number]                              Today's Date: 2024       Admit Date: 6/10/2024    Visit Dx:     ICD-10-CM ICD-9-CM   1. SOB (shortness of breath)  R06.02 786.05   2. Essential hypertension  I10 401.9     Patient Active Problem List   Diagnosis    Hip joint replacement status    S/P total hip arthroplasty    Osteoarthritis of hip     Past Medical History:   Diagnosis Date    Arthritis     Atherosclerosis     Avascular necrosis     RIGHT HIP    GERD (gastroesophageal reflux disease)     Heart valve disorder     \"FLOPPY\"    History of gastric ulcer     AGE 20'S    [place] (hard of hearing)     Hypertension     Right hip pain     Scab     LEFT SHIN     Past Surgical History:   Procedure Laterality Date    FEMUR FRACTURE SURGERY Right     3 SCREWS    FEMUR FRACTURE SURGERY Left     \"BALL AND SHAFT\"    HERNIA REPAIR      WITH MESH, INGUINAL AND UMBILICAL    TOTAL HIP ARTHROPLASTY REVISION Right 6/10/2024    Procedure: RIGHT TOTAL HIP ARTHROPLASTY CONVERSION;  Surgeon: Mio Roldan II, MD;  [facility] [facility] [place] [facility]  Service: Orthopedics;  Laterality: Right;      General Information       Row Name 24 1151          Physical Therapy Time and Intention    Document Type evaluation  -DJ     Mode of Treatment individual therapy;physical therapy  -DJ       Row Name 24 1151          General Information    Patient Profile Reviewed yes  -DJ     Prior Level of Function independent:;ADL's  rides bicycle 2 hours daily  -DJ     Existing Precautions/Restrictions fall;hip, anterior;right  -DJ     Barriers to Rehab none identified  -DJ       Row Name 24 1151          Living Environment    People in Home alone;other (see comments)  lives alone in [facility] [place] but will be staying at son's home here in [facility] with 4 CARMEL and 13 inside stairs  -DJ       Row Name 24 1151          Home Main Entrance    Number of Stairs, Main Entrance four  -DJ  "      Row Name 06/11/24 1151          Stairs Within Home, Primary    Number of Stairs, Within Home, Primary other (see comments)  13  -DJ       Row Name 06/11/24 1151          Cognition    Orientation Status (Cognition) oriented x 4  -DJ       Row Name 06/11/24 1151          Safety Issues, Functional Mobility    Safety Issues Affecting Function (Mobility) judgment;safety precaution awareness  -DJ     Impairments Affecting Function (Mobility) balance;endurance/activity tolerance;strength;pain  -DJ     Comment, Safety Issues/Impairments (Mobility) gt belt, rubber soled sandals  -DJ               User Key  (r) = Recorded By, (t) = Taken By, (c) = Cosigned By      Initials Name Provider Type    Gisselle Miller, PT Physical Therapist                   Mobility       Row Name 06/11/24 1156          Bed Mobility    Bed Mobility supine-sit  -DJ     Supine-Sit Baxter (Bed Mobility) standby assist;verbal cues  -DJ     Assistive Device (Bed Mobility) bed rails;head of bed elevated  -DJ     Comment, (Bed Mobility) discomfort moving R LE  -DJ       Row Name 06/11/24 1156          Transfers    Comment, (Transfers) sit/stand from EOB  -DJ       Row Name 06/11/24 1156          Bed-Chair Transfer    Bed-Chair Baxter (Transfers) not tested  -DJ       Row Name 06/11/24 1156          Sit-Stand Transfer    Sit-Stand Baxter (Transfers) standby assist;verbal cues  -DJ     Assistive Device (Sit-Stand Transfers) walker, front-wheeled  -DJ       Row Name 06/11/24 1156          Gait/Stairs (Locomotion)    Baxter Level (Gait) contact guard;verbal cues  -DJ     Assistive Device (Gait) walker, front-wheeled  -DJ     Distance in Feet (Gait) 150  -DJ     Deviations/Abnormal Patterns (Gait) gait speed decreased;stride length decreased;antalgic;weight shifting decreased  -DJ     Bilateral Gait Deviations forward flexed posture;heel strike decreased  -DJ     Comment, (Gait/Stairs) Pt amb 150' with r wx and SBA - slow pace, fair  balance, leg length descrepency, good balance. Pt negotiated 8 stairs with handrail and SBA.  -DJ               User Key  (r) = Recorded By, (t) = Taken By, (c) = Cosigned By      Initials Name Provider Type    Gisselle Miller, PT Physical Therapist                   Obj/Interventions       Row Name 06/11/24 1158          Range of Motion Comprehensive    Comment, General Range of Motion grossly WFL  -DJ       Row Name 06/11/24 1158          Strength Comprehensive (MMT)    Comment, General Manual Muscle Testing (MMT) Assessment expected post-op R LE weakness but able to SLR  -DJ       Row Name 06/11/24 1158          Motor Skills    Motor Skills functional endurance  -DJ     Functional Endurance fair  -DJ     Therapeutic Exercise other (see comments)  reviewed NIURKA HEP  -DJ       Row Name 06/11/24 1158          Balance    Balance Assessment standing static balance;standing dynamic balance  -DJ     Static Standing Balance standby assist;verbal cues  -DJ     Dynamic Standing Balance standby assist;verbal cues  -DJ     Position/Device Used, Standing Balance walker, front-wheeled;supported  -DJ     Balance Interventions sitting;standing;sit to stand;supported;weight shifting activity  -DJ     Comment, Balance good  -DJ       Row Name 06/11/24 1158          Sensory Assessment (Somatosensory)    Sensory Assessment (Somatosensory) not tested  -DJ               User Key  (r) = Recorded By, (t) = Taken By, (c) = Cosigned By      Initials Name Provider Type    Gisselle Miller, PT Physical Therapist                   Goals/Plan       Row Name 06/11/24 1306          Bed Mobility Goal 1 (PT)    Activity/Assistive Device (Bed Mobility Goal 1, PT) sit to supine;supine to sit  -DJ     [place] Level/Cues Needed (Bed Mobility Goal 1, PT) modified independence;verbal cues required  -DJ     Time Frame (Bed Mobility Goal 1, PT) 1 week  -DJ       Row Name 06/11/24 1306          Transfer Goal 1 (PT)    Activity/Assistive Device  (Transfer Goal 1, PT) sit-to-stand/stand-to-sit  -DJ     Bent Level/Cues Needed (Transfer Goal 1, PT) verbal cues required;modified independence  -DJ     Time Frame (Transfer Goal 1, PT) 1 week  -DJ       Row Name 06/11/24 1306          Gait Training Goal 1 (PT)    Activity/Assistive Device (Gait Training Goal 1, PT) gait (walking locomotion);assistive device use;improve balance and speed;increase endurance/gait distance;increase energy conservation;walker, rolling  -DJ     Bent Level (Gait Training Goal 1, PT) standby assist;verbal cues required  -DJ     Distance (Gait Training Goal 1, PT) >200'  -DJ     Time Frame (Gait Training Goal 1, PT) 1 week  -DJ       Row Name 06/11/24 1306          Stairs Goal 1 (PT)    Activity/Assistive Device (Stairs Goal 1, PT) ascending stairs;descending stairs  -DJ     Bent Level/Cues Needed (Stairs Goal 1, PT) standby assist  -DJ     Number of Stairs (Stairs Goal 1, PT) 8  -DJ     Time Frame (Stairs Goal 1, PT) 1 week  -DJ       Row Name 06/11/24 1306          Patient Education Goal (PT)    Activity (Patient Education Goal, PT) HEP  -DJ     Bent/Cues/Accuracy (Memory Goal 2, PT) demonstrates adequately;verbalizes understanding  -DJ     Time Frame (Patient Education Goal, PT) 2 days;short term goal (STG)  -DJ       Row Name 06/11/24 1306          Therapy Assessment/Plan (PT)    Planned Therapy Interventions (PT) balance training;bed mobility training;gait training;home exercise program;transfer training;strengthening;stair training;postural re-education;patient/family education  -DJ               User Key  (r) = Recorded By, (t) = Taken By, (c) = Cosigned By      Initials Name Provider Type    DJ iGsselle Jackson, PT Physical Therapist                   Clinical Impression       Row Name 06/11/24 1256          Pain    Pain Location - Side/Orientation Right  -DJ     Pain Location anterior  -DJ     Pain Location - hip  -DJ     Pre/Posttreatment Pain Comment  pt with eexpected c/o R ant hip pain with hip flex  -DJ     Pain Intervention(s) Repositioned;Rest  -DJ       Row Name 06/11/24 1256          Plan of Care Review    Plan of Care Reviewed With patient  -DJ     Outcome Evaluation 78yo pleasant white male admmitted 6/10/24 with avascular necrosis R hip; now s/p R total hip revision POD #1. PMH includes R femur fx s/p ORIF and leg length descrepency. PLOF: Pt lives alone in [place] but will be staying at his son's home in [place] upon d/c. His son has 4 CARMEL and 13 inside stairs. Pt was previously independent with ADLS as well as mobility including riding bicycle. He is limited now by expected c/o post-op R hip pain, R LE weakness, and decreased activity tolerance. He would certainly benefit from skilled PT services to address functional deficits and prepare for d/c home with son.  -DJ       Row Name 06/11/24 1256          Therapy Assessment/Plan (PT)    Patient/Family Therapy Goals Statement (PT) home with son and home PT services  -DJ     Rehab Potential (PT) good, to achieve stated therapy goals  -DJ     Criteria for Skilled Interventions Met (PT) yes;meets criteria;skilled treatment is necessary  -DJ     Therapy Frequency (PT) daily  -DJ       Row Name 06/11/24 1256          Vital Signs    O2 Delivery Pre Treatment room air  -DJ     O2 Delivery Intra Treatment room air  -DJ     O2 Delivery Post Treatment room air  -DJ     Pre Patient Position Supine  -DJ     Intra Patient Position Standing  -DJ     Post Patient Position Sitting  -DJ       Row Name 06/11/24 1256          Positioning and Restraints    Pre-Treatment Position in bed  -DJ     Post Treatment Position chair  -DJ     In Chair notified nsg;reclined;call light within reach;encouraged to call for assist;exit alarm on  -DJ               User Key  (r) = Recorded By, (t) = Taken By, (c) = Cosigned By      Initials Name Provider Type    Gisselle Miller, PT Physical Therapist                   Outcome Measures        Row Name 06/11/24 1307          How much help from another person do you currently need...    Turning from your back to your side while in flat bed without using bedrails? 3  -DJ     Moving from lying on back to sitting on the side of a flat bed without bedrails? 3  -DJ     Moving to and from a bed to a chair (including a wheelchair)? 3  -DJ     Standing up from a chair using your arms (e.g., wheelchair, bedside chair)? 3  -DJ     Climbing 3-5 steps with a railing? 3  -DJ     To walk in hospital room? 3  -DJ     AM-PAC 6 Clicks Score (PT) 18  -DJ     Highest Level of Mobility Goal 6 --> Walk 10 steps or more  -DJ       Row Name 06/11/24 1307          Functional Assessment    Outcome Measure Options AM-PAC 6 Clicks Basic Mobility (PT)  -               User Key  (r) = Recorded By, (t) = Taken By, (c) = Cosigned By      Initials Name Provider Type    Gisselle Miller, PT Physical Therapist                                 Physical Therapy Education       Title: PT OT SLP Therapies (Done)       Topic: Physical Therapy (Done)       Point: Mobility training (Done)       Learning Progress Summary             Patient Acceptance, E, VU by  at 6/11/2024 1308                         Point: Home exercise program (Done)       Learning Progress Summary             Patient Acceptance, E, VU by  at 6/11/2024 1308                         Point: Body mechanics (Done)       Learning Progress Summary             Patient Acceptance, E, VU by  at 6/11/2024 1308                         Point: Precautions (Done)       Learning Progress Summary             Patient Acceptance, E, VU by  at 6/11/2024 1308                                         User Key       Initials Effective Dates Name Provider Type Discipline     10/25/19 -  Gisselle Jackson, PT Physical Therapist PT                  PT Recommendation and Plan  Planned Therapy Interventions (PT): balance training, bed mobility training, gait training, home exercise program,  transfer training, strengthening, stair training, postural re-education, patient/family education  Plan of Care Reviewed With: patient  Outcome Evaluation: 80yo pleasant white male admmitted 6/10/24 with avascular necrosis R hip; now s/p R total hip revision POD #1. PMH includes R femur fx s/p ORIF and leg length descrepency. PLOF: Pt lives alone in [place] but will be staying at his son's home in [place] upon d/c. His son has 4 CARMEL and 13 inside stairs. Pt was previously independent with ADLS as well as mobility including riding bicycle. He is limited now by expected c/o post-op R hip pain, R LE weakness, and decreased activity tolerance. He would certainly benefit from skilled PT services to address functional deficits and prepare for d/c home with son.     Time Calculation:   PT Evaluation Complexity  History, PT Evaluation Complexity: 3 or more personal factors and/or comorbidities  Examination of Body Systems (PT Eval Complexity): total of 4 or more elements  Clinical Presentation (PT Evaluation Complexity): evolving  Clinical Decision Making (PT Evaluation Complexity): moderate complexity  Overall Complexity (PT Evaluation Complexity): moderate complexity     PT Charges       Row Name 06/11/24 1310             Time Calculation    Start Time 0842  -DJ      Stop Time 0909  -DJ      Time Calculation (min) 27 min  -DJ      PT Non-Billable Time (min) 10 min  -DJ      PT Received On 06/11/24  -DJ      PT - Next Appointment 06/12/24  -DJ      PT Goal Re-Cert Due Date 06/18/24  -DJ                User Key  (r) = Recorded By, (t) = Taken By, (c) = Cosigned By      Initials Name Provider Type    Gisselle Miller PT Physical Therapist                  Therapy Charges for Today       Code Description Service Date Service Provider Modifiers Qty    05309754523 HC PT EVAL MOD COMPLEXITY 2 6/11/2024 Gisselle Jackson, PT GP 1    20631158788 HC PT THERAPEUTIC ACT EA 15 MIN 6/11/2024 Gisselle Jackson, PT GP 1    20166700163 HC  GAIT TRAINING EA 15 MIN 6/11/2024 Gisselle Jackson, PT GP 1            PT G-Codes  Outcome Measure Options: AM-PAC 6 Clicks Basic Mobility (PT)  AM-PAC 6 Clicks Score (PT): 18  PT Discharge Summary  Anticipated Discharge Disposition (PT): home with home health, home with assist    Gisselle Jackson, PT  6/11/2024

## 2024-06-11 NOTE — PROGRESS NOTES
Patient is postop day 1 total hip arthroplasty.  Plan is home discharge tomorrow.  Continue mobilization and pain control while in the hospital.

## 2024-06-11 NOTE — DISCHARGE INSTRUCTIONS
Total Hip  Discharge Instructions  Dr. VIRAL Velarde” Jamar II  [phone]    INCISION CARE  Wash your hands prior to dressing changes  ADELITA Wound VAC: Postoperatively you had a ADELITA Wound Vac placed on the incision. This was placed under sterile conditions in the operating room. It remains in place for 7 days postoperatively. After 7 days, the entire dressing must be removed, including all of the sticky adhesive. The dressing and battery pack provide gentle suction to the incision and provide several benefits over a traditional dressing:  It maintains the sterile environment of the OR and reduces the risk of infection  The suction removes unwanted buildup of blood/hematoma under the skin to reduce swelling  The suction also promotes fresh blood supply to the skin and soft tissue to speed up healing  The postoperative scar is reduced in size  Showering is permitted immediately after surgery, but the battery pack must be protected or removed during the shower.   After 7 days the ADELITA Wound Vac is removed. If there is no drainage, no dressing is required. If there is some scant drainage a dry bandage can be applied and changed daily until seen in the office or until the drainage stops.   No creams or ointments to the incisions until 4 weeks post op.  Do not touch or pick at the incision  Check incision every day and notify surgeon immediately if any of the following signs or symptoms are seen:  Increase in redness  Increase in swelling around the incision and of the entire extremity  Increase in pain  NEW drainage or oozing from the incision  Pulling apart of the edges of the incision  Increase in overall body temperature (greater than 100.4 degrees)  Zip-Line: your incision was closed with a state of the art device.   Is a non-invasive and easy to use wound closure device that replaces sutures, staples and glue for surgical incisions  It minimizes scarring and eliminating “railroad” marks that come with staples or  sutures  It makes removal as atraumatic as peeling off a bandage  Can be removed at home or by a physical therapist or nurse at 14 days postoperatively    ACTIVITIES  Exercises:  Physical therapy will begin immediately while in the hospital. Patients going to a nursing home will get therapy as part of their care at the SNU/SNF facility. Patients going home may also have a therapist come to the house to help them mobilize until they can safely get to an outpatient therapy facility.  Elevate the affected leg most of the day during the first week post operatively. Caution must be taken to avoid pillow placement directly under the heel of the leg, as this can cause pressure ulcers even with a soft pillow. All pillows and blankets should be placed underneath of the thigh and calf so that the heel is free-floating.  Use cold packs for 20-30 minutes approximately 5 times per day.  You should perform the daily stretching and strengthening exercises as taught by the therapist as often as possible. This can be done many times a day.  Full weight bearing is allowed after surgery. It will be sore/painful to put weight on the leg, but this will help the bone to heal and prevent complications such as pneumonia, bed sores and blood clots. Mobilization is vital to the recovery process.  Activities of Daily Living:  No tub baths, hot tubs, or swimming pools for 4 weeks.  May shower and let water run over the incision immediately after surgery. The battery pack of the ADELITA Wound Vac must be protected or removed while in the shower. After the ADELITA is removed 7 days after surgery showering is permitted as long as there is no drainage from the wound.     Restrictions  Weight: It is ok to allow full weight bearing after surgery. Weight on the leg actually quickens the recovery process. While it will be sore/painful to put weight on the leg, it is safe to do so. Hip replacement after hip fracture has a much slower recover process. It can  take months to heal fully from a hip fracture and patients even make some slow benefits up to a year afterwards.   Driving: Many patients have questions about when it is safe to return to driving. The answer is that this is extremely variable. It depends on the extent of the surgery, as well as how quickly you heal. Certainly left leg surgeries make returning to driving easier while right leg surgeries require more extensive rehabilitation before driving can be safe. Until you can press down on the brake hard, and are off of all narcotics, driving is not permitted. Your surgeon cannot “clear” you to return to driving, only you can make the decision when you feel it is safe.    Medications  Anticoagulants: After upper extremity surgery most patients do not require an anticoagulant unless you have another injury that will be keeping you from mobilizing. Lower extremity surgery typically does require use of an anticoagulation medicine.   IF YOU HAD LOWER EXTREMITY SURGERY AND ARE NOT DISCHARGED HOME WITH ANY ANTICOAGULANT MEDICINE YOU SHOULD TAKE ASPIRIN 325mg DAILY FOR 30 DAYS POSTOPERATIVELY.  If you are discharged home with an anticoagulant such as Aspirin, Xarelto, Eliquis, Coumadin, or Lovenox, follow these simple instructions:   Notify surgeon immediately if any kwan bleeding is noted in the urine, stool, emesis, or from the nose or the incision. Blood in the stool will often appear as black rather than red. Blood in urine may appear as pink. Blood in emesis may appear as brown/black like coffee grounds.  You will need to apply pressure for longer periods of time to any cuts or abrasions to stop bleeding  Avoid alcohol while taking anticoagulants  Most anticoagulants are to be taken for 30 days postoperatively. After this time, you may stop using them unless instructed otherwise.   If you were already taking an anticoagulant (commonly Aspirin, Coumadin, or Plavix) you will likely be resuming your normal dose  postoperatively and will be continuing that medication at the discretion of the prescribing physician.  Stool Softeners: You will be at greater risk of constipation after surgery due to being less mobile and the pain medications.  Take stool softeners as needed. Over the counter Colace 100 mg 1-2 capsules twice daily can be taken.  If stools become too loose or too frequent, please decreases the dosage or stop the stool softener.  If constipation occurs despite use of stool softeners, you are to continue the stool softeners and add a laxative (Milk of Magnesia 1 ounce daily as needed)  Drink plenty of fluids, and eat fruits and vegetables during your recovery time. Getting up and mobilizing will help the bowels to recover their regular function, as will weaning off of all narcotics when the pain becomes tolerable.  Pain Medications: Utilized after surgery are narcotics. This is some general information about these medications.  CLASSIFICATION: Pain medications are called Opioids and are narcotics  LEGALITIES: It is illegal to share narcotics with others  DRIVING: it is illegal to drive while under the influence of narcotics. Doing so is a DUI.  POTENTIAL SIDE EFFECTS: nausea, vomiting, itching, dizziness, drowsiness, dry mouth, constipation, and difficulty urinating.  POTENTIAL ADVERSE EFFECTS:  Opioid tolerance can develop with use of pain medications and this simply means that it requires more and more of the medication to control pain. However, this is seen more in patients that use opioids for longer periods of time.  Opioid dependence can develop with use of Opioids. People with opioid dependence will experience withdrawal symptoms upon cessation of the medication.  Opioid addiction can develop with use of Opioids. The incidence of this is very unlikely in patients who take the medications as ordered and stop the medications as instructed.  Opioid overdose can be dangerous, but is unlikely when the medication  is taken as ordered and stopped when ordered. It is important not to mix opioids with alcohol as this can lead to over sedation and respiratory difficulty.  DOSAGE:  After the initial surgical pain begins to resolve, you may begin to decrease the pain medication. By the end of a few weeks, you should be off of pain medications.  Refills will not be given by the office during evening hours, on weekends, or after 6 weeks post-op. You are responsible for weaning off of pain medication. You can increase the time between narcotic pills, taking one every 4 then 6 then 8 hours and so on.  To seek refills on pain medications during the initial 6-week post-operative period, you must call the office to request the refill. The office will then notify you when to  the prescription. DO NOT wait until you are out of the medication to request a refill. Prescriptions will not be filled over the weekend and depending on the schedule, it may take a couple days for the prescription to be available. Someone will have to pick the prescription in person at the office.    FOLLOW-UP VISITS  You will need to follow up in the office with your surgeon in 3 weeks, or as instructed elsewhere in your discharge paperwork. Please call this number [phone] to schedule this appointment. If you are going to an SNF/SNU facility, they will arrange for you to follow up in the office.  If you have any concerns or suspected complications prior to your follow up visit, please call the office. Do not wait until your appointment time if you suspect complications. These will need to be addressed in the office promptly.      Mio Roldan II, MD  Orthopaedic Surgery  [facility]

## 2024-06-11 NOTE — CASE MANAGEMENT/SOCIAL WORK
Discharge Planning Assessment  [facility]     Patient Name: Chacho Martinez  MRN: [id number]  Today's Date: 6/11/2024    Admit Date: 6/10/2024    Plan: home with son and CLARITA   Discharge Needs Assessment       Row Name 06/11/24 0900       Living Environment    People in Home alone    Current Living Arrangements home    Potentially Unsafe Housing Conditions none    Primary Care Provided by self    Provides Primary Care For no one    Family Caregiver if Needed child(barb), adult    Family Caregiver Names Patient lives alone in [place], but will be staying with son, Arya Nelson [phone], at discharge    Quality of Family Relationships helpful;involved;supportive    Able to Return to Prior Arrangements yes       Resource/Environmental Concerns    Resource/Environmental Concerns home accessibility    Home Accessibility Concerns stairs to enter home    Transportation Concerns no car       Transition Planning    Patient/Family Anticipates Transition to home with family    Patient/Family Anticipated Services at Transition     Transportation Anticipated family or friend will provide       Discharge Needs Assessment    Equipment Currently Used at Home walker, rolling;cane, straight    Concerns to be Addressed denies needs/concerns at this time    Anticipated Changes Related to Illness none                   Discharge Plan       Row Name 06/11/24 0848       Plan    Plan home with son dung OTTO    Patient/Family in Agreement with Plan yes    Plan Comments Spoke with patient at bedside. Introduced self and explained role. Facesheet verified. Patient lives in [place], but will be staying with his son, Arya Nelson [phone], at discharge. The address is [address] [place] [address]. Patient states that there are 4 steps to get into the finished basement and that is mostly where he will be staying. There are 13 steps to get from the basement to the main level of the house. Patient states that he does  have walker. Per care plan, [facility] will follow at [place]. Referral placed in EPIC.  Patient states that he has had multiple orthopedic surgeries and is unsure how much he need their services. CCP explained the benefits of home PT as well as the importance of having someone assess the surgical site. Patient in agreement to have [facility] follow, but states that he and his son will discuss further and determine how many visitis are needed. Patient states that he has a $35.00/visit co-pay and doesn't want to incur any unecessary costs. Son will transprot home at [place]. CCP will follow.                  Continued Care and Services - Admitted Since 6/10/2024       Home Medical Care Coordination complete.      Service Provider Request Status Selected Services Address Phone Fax Patient Preferred    [facility]  Selected Home Health Services [address] [place] [address] [phone] --                  Expected Discharge Date and Time       Expected Discharge Date Expected Discharge Time    Jun 12, 2024            Demographic Summary       Row Name 06/11/24 0900       General Information    Referral Source admission list    Reason for Consult discharge planning    Preferred Language English      Row Name 06/11/24 0858       General Information    Admission Type inpatient    Arrived From emergency department    Required Notices Provided Important Message from Medicare                   Functional Status       Row Name 06/11/24 0900       Functional Status    Usual Activity Tolerance good    Current Activity Tolerance moderate       Functional Status, IADL    Medications independent    Meal Preparation independent    Housekeeping independent    Laundry independent    Shopping independent       Mental Status    General Appearance WDL WDL                   Psychosocial    No documentation.                  Abuse/Neglect    No documentation.                  Legal    No documentation.                   Substance Abuse    No documentation.                  Patient Forms    No documentation.                     May Gil RN

## 2024-06-11 NOTE — PLAN OF CARE
Goal Outcome Evaluation:  Plan of Care Reviewed With: patient           Outcome Evaluation: 78yo pleasant white male admmitted 6/10/24 with avascular necrosis R hip; now s/p R total hip revision POD #1. PMH includes R femur fx s/p ORIF and leg length descrepency. PLOF: Pt lives alone in [place] but will be staying at his son's home in [place] upon d/c. His son has 4 CARMEL and 13 inside stairs. Pt was previously independent with ADLS as well as mobility including riding bicycle. He is limited now by expected c/o post-op R hip pain, R LE weakness, and decreased activity tolerance. He would certainly benefit from skilled PT services to address functional deficits and prepare for d/c home with son.      Anticipated Discharge Disposition (PT): home with home health, home with assist

## 2024-06-11 NOTE — PLAN OF CARE
Goal Outcome Evaluation:  Plan of Care Reviewed With: patient        Progress: improving  Outcome Evaluation: Patient is ambulating using walker and stand by assist. VSS and voiding function is intact. Pain is minimal and well managed with ice and prn percocet. Patient educated on bp monitoring and med management. Plan for d/c home with hh and family assist tomorrow.

## 2024-06-11 NOTE — DISCHARGE PLACEMENT REQUEST
"Devan Martinez (79 y.o. Male)       Date of Birth   1945    Social Security Number       Address   [address] [place], [place] [address]    Home Phone   [phone]    MRN   [id number]       [facility]   Patient Refused    Marital Status   Single                            Admission Date   6/10/24    Admission Type   Elective    Admitting Provider   Mio Roldan II, MD    Attending Provider   Mio Roldan II, MD    Department, Room/Bed   [address], P795/1       Discharge Date       Discharge Disposition   Home or Self Care    Discharge Destination                                 Attending Provider: Mio Roldan II, MD    Allergies: No Known Allergies    Isolation: None   Infection: None   Code Status: Not on file    Ht: 170.2 cm (67\")   Wt: 65 kg (143 lb 3.2 oz)    Admission Cmt: None   Principal Problem: Hip joint replacement status [Z96.649]                   Active Insurance as of 6/10/2024       Primary Coverage       Payor Plan Insurance Group Employer/Plan Group    ANTHEM MEDICARE REPLACEMENT ANTHEM MEDICARE ADVANTAGE [id number]       Payor Plan Address Payor Plan Phone Number Payor Plan Fax Number Effective Dates    [address] [phone]  1/1/2024 - None Entered    [facility] [id number]         Subscriber Name Subscriber Birth Date Member ID       DEVAN MARTINEZ 1945 [id number]                     Emergency Contacts        (Rel.) Home Phone Work Phone Mobile Phone    TAI CHAN (Son) [phone] -- [phone]                "

## 2024-06-11 NOTE — PLAN OF CARE
Goal Outcome Evaluation:              Outcome Evaluation: POD1 R total hip conversion with Dr. Roldan. A&OX4. VSS. RA. ADELITA dressing dry and intact with dried drainage noted. Neuro checks WDL. Patient denies pain at this time. NS infusing @ 100mL/hr per order. Ambulating assist X1 with walker and gait belt. WBAT.

## 2024-06-12 LAB
ABO GROUP BLD: NORMAL
BLD GP AB SCN SERPL QL: NEGATIVE
HCT VFR BLD AUTO: 18.1 % (ref 37.5–51)
HGB BLD-MCNC: 6.1 G/DL (ref 13–17.7)
RH BLD: POSITIVE
T&S EXPIRATION DATE: NORMAL

## 2024-06-12 PROCEDURE — 63710000001 POLYETHYLENE GLYCOL 17 G PACK: Performed by: ORTHOPAEDIC SURGERY

## 2024-06-12 PROCEDURE — 86900 BLOOD TYPING SEROLOGIC ABO: CPT | Performed by: NURSE PRACTITIONER

## 2024-06-12 PROCEDURE — 63710000001 OXYCODONE-ACETAMINOPHEN 5-325 MG TABLET: Performed by: ORTHOPAEDIC SURGERY

## 2024-06-12 PROCEDURE — 63710000001 ASPIRIN 81 MG TABLET DELAYED-RELEASE: Performed by: ORTHOPAEDIC SURGERY

## 2024-06-12 PROCEDURE — A9270 NON-COVERED ITEM OR SERVICE: HCPCS | Performed by: ORTHOPAEDIC SURGERY

## 2024-06-12 PROCEDURE — 85018 HEMOGLOBIN: CPT | Performed by: ORTHOPAEDIC SURGERY

## 2024-06-12 PROCEDURE — P9016 RBC LEUKOCYTES REDUCED: HCPCS

## 2024-06-12 PROCEDURE — 85014 HEMATOCRIT: CPT | Performed by: ORTHOPAEDIC SURGERY

## 2024-06-12 PROCEDURE — 86901 BLOOD TYPING SEROLOGIC RH(D): CPT

## 2024-06-12 PROCEDURE — 63710000001 MELOXICAM 15 MG TABLET: Performed by: ORTHOPAEDIC SURGERY

## 2024-06-12 PROCEDURE — 63710000001 FAMOTIDINE 20 MG TABLET: Performed by: ORTHOPAEDIC SURGERY

## 2024-06-12 PROCEDURE — 86900 BLOOD TYPING SEROLOGIC ABO: CPT

## 2024-06-12 PROCEDURE — 36430 TRANSFUSION BLD/BLD COMPNT: CPT

## 2024-06-12 PROCEDURE — 63710000001 CARVEDILOL 6.25 MG TABLET: Performed by: ORTHOPAEDIC SURGERY

## 2024-06-12 PROCEDURE — 86850 RBC ANTIBODY SCREEN: CPT | Performed by: NURSE PRACTITIONER

## 2024-06-12 PROCEDURE — 86901 BLOOD TYPING SEROLOGIC RH(D): CPT | Performed by: NURSE PRACTITIONER

## 2024-06-12 PROCEDURE — 86923 COMPATIBILITY TEST ELECTRIC: CPT

## 2024-06-12 RX ADMIN — OXYCODONE AND ACETAMINOPHEN 1 TABLET: 5; 325 TABLET ORAL at 02:13

## 2024-06-12 RX ADMIN — POLYETHYLENE GLYCOL 3350 17 G: 17 POWDER, FOR SOLUTION ORAL at 17:55

## 2024-06-12 RX ADMIN — FAMOTIDINE 40 MG: 20 TABLET, FILM COATED ORAL at 08:58

## 2024-06-12 RX ADMIN — CARVEDILOL 6.25 MG: 6.25 TABLET, FILM COATED ORAL at 08:58

## 2024-06-12 RX ADMIN — OXYCODONE AND ACETAMINOPHEN 1 TABLET: 5; 325 TABLET ORAL at 07:02

## 2024-06-12 RX ADMIN — ASPIRIN 81 MG: 81 TABLET, COATED ORAL at 22:05

## 2024-06-12 RX ADMIN — CARVEDILOL 6.25 MG: 6.25 TABLET, FILM COATED ORAL at 22:06

## 2024-06-12 RX ADMIN — MELOXICAM 15 MG: 15 TABLET ORAL at 08:58

## 2024-06-12 RX ADMIN — ASPIRIN 81 MG: 81 TABLET, COATED ORAL at 08:58

## 2024-06-12 RX ADMIN — OXYCODONE AND ACETAMINOPHEN 1 TABLET: 5; 325 TABLET ORAL at 17:55

## 2024-06-12 RX ADMIN — OXYCODONE AND ACETAMINOPHEN 1 TABLET: 5; 325 TABLET ORAL at 22:06

## 2024-06-12 NOTE — PLAN OF CARE
Goal Outcome Evaluation:  Plan of Care Reviewed With: patient        Progress: no change  Outcome Evaluation: Patient receiving 2nd unit of PRBCs due to Hgb of 6.1   A & O. Makes needs known. Resting in room. Up with assist x 1. No c/o pain. Omayra dressing intact. No s/s of distress noted. Possible d/c home tomorrow.

## 2024-06-12 NOTE — PROGRESS NOTES
/71 (BP Location: Right arm, Patient Position: Lying)   Pulse 78   Temp 97.5 °F (36.4 °C) (Oral)   Resp 18   SpO2 93%     Results from last 7 days   Lab Units 06/12/24  0347 06/10/24  1708 06/05/24  1621   WBC 10*3/mm3  --   --  6.25   HEMOGLOBIN g/dL 6.1*   < > 11.0*   HEMATOCRIT % 18.1*   < > 33.7*   PLATELETS 10*3/mm3  --   --  263    < > = values in this interval not displayed.       Results from last 7 days   Lab Units 06/10/24  1708   SODIUM mmol/L 139   POTASSIUM mmol/L 3.7   CHLORIDE mmol/L 105   CO2 mmol/L 21.7*   BUN mg/dL 12   CREATININE mg/dL 0.71*   GLUCOSE mg/dL 147*   CALCIUM mg/dL 8.0*       Imaging Results (Last 24 Hours)       ** No results found for the last 24 hours. **            Patient Care Team:  Provider, No Known as PCP - Gen Wolfe MD as Consulting Physician (Cardiology)    SUBJECTIVE  Doing ok.  Hb is 6.1  PHYSICAL EXAM  Omayra intact     Hip joint replacement status    S/P total hip arthroplasty    Osteoarthritis of hip      PLAN / DISPOSITION:  Transfuse.  Keep one more day to monitor Hb  CARO Basilio  06/12/24  07:57 EDT

## 2024-06-12 NOTE — SIGNIFICANT NOTE
"   06/12/24 1541   OTHER   Discipline physical therapist   Rehab Time/Intention   Session Not Performed other (see comments)  (pt hgb 6.1 this AM, on 2nd of 2 units blood. pt requested to stay in bed to allow transfusion to complete and not \"bend my arm.\" Pt states he will complete RLE therex in bed. will follow up next date.)   Recommendation   PT - Next Appointment 06/13/24       "

## 2024-06-12 NOTE — PLAN OF CARE
Goal Outcome Evaluation:              Outcome Evaluation: 79 y.o patient POD2 R total hip conversion with Dr. Roldan. A&OX4. VSS. RA. ADELITA dressing dry and intact with dried drainage noted- order to change POD2 (today). Neuro checks WDL. Pain managed via PRN medications. Bowel regimen added PRN per patient's request and given. Ambulating assist X1 with walker and gait belt. WBAT. Lab called with critical H&H- Hg 6.1, Hct 18.1- teamed paged, waiting for a response. Plan to d/c home with family today.

## 2024-06-13 VITALS
HEIGHT: 67 IN | RESPIRATION RATE: 16 BRPM | BODY MASS INDEX: 22.49 KG/M2 | TEMPERATURE: 98.1 F | OXYGEN SATURATION: 98 % | SYSTOLIC BLOOD PRESSURE: 166 MMHG | HEART RATE: 86 BPM | WEIGHT: 143.3 LBS | DIASTOLIC BLOOD PRESSURE: 78 MMHG

## 2024-06-13 LAB
BH BB BLOOD EXPIRATION DATE: NORMAL
BH BB BLOOD EXPIRATION DATE: NORMAL
BH BB BLOOD TYPE BARCODE: 600
BH BB BLOOD TYPE BARCODE: 600
BH BB DISPENSE STATUS: NORMAL
BH BB DISPENSE STATUS: NORMAL
BH BB PRODUCT CODE: NORMAL
BH BB PRODUCT CODE: NORMAL
BH BB UNIT NUMBER: NORMAL
BH BB UNIT NUMBER: NORMAL
CROSSMATCH INTERPRETATION: NORMAL
CROSSMATCH INTERPRETATION: NORMAL
HCT VFR BLD AUTO: 25 % (ref 37.5–51)
HGB BLD-MCNC: 8.3 G/DL (ref 13–17.7)
UNIT  ABO: NORMAL
UNIT  ABO: NORMAL
UNIT  RH: NORMAL
UNIT  RH: NORMAL

## 2024-06-13 PROCEDURE — 63710000001 MELOXICAM 15 MG TABLET: Performed by: ORTHOPAEDIC SURGERY

## 2024-06-13 PROCEDURE — 63710000001 OXYCODONE-ACETAMINOPHEN 5-325 MG TABLET: Performed by: ORTHOPAEDIC SURGERY

## 2024-06-13 PROCEDURE — A9270 NON-COVERED ITEM OR SERVICE: HCPCS | Performed by: ORTHOPAEDIC SURGERY

## 2024-06-13 PROCEDURE — 63710000001 FAMOTIDINE 20 MG TABLET: Performed by: ORTHOPAEDIC SURGERY

## 2024-06-13 PROCEDURE — 63710000001 POLYETHYLENE GLYCOL 17 G PACK: Performed by: ORTHOPAEDIC SURGERY

## 2024-06-13 PROCEDURE — 63710000001 CARVEDILOL 6.25 MG TABLET: Performed by: ORTHOPAEDIC SURGERY

## 2024-06-13 PROCEDURE — 97530 THERAPEUTIC ACTIVITIES: CPT

## 2024-06-13 PROCEDURE — 63710000001 ASPIRIN 81 MG TABLET DELAYED-RELEASE: Performed by: ORTHOPAEDIC SURGERY

## 2024-06-13 PROCEDURE — 97116 GAIT TRAINING THERAPY: CPT

## 2024-06-13 RX ADMIN — FAMOTIDINE 40 MG: 20 TABLET, FILM COATED ORAL at 08:46

## 2024-06-13 RX ADMIN — POLYETHYLENE GLYCOL 3350 17 G: 17 POWDER, FOR SOLUTION ORAL at 08:54

## 2024-06-13 RX ADMIN — OXYCODONE AND ACETAMINOPHEN 1 TABLET: 5; 325 TABLET ORAL at 13:35

## 2024-06-13 RX ADMIN — CARVEDILOL 6.25 MG: 6.25 TABLET, FILM COATED ORAL at 08:46

## 2024-06-13 RX ADMIN — OXYCODONE AND ACETAMINOPHEN 1 TABLET: 5; 325 TABLET ORAL at 08:46

## 2024-06-13 RX ADMIN — ASPIRIN 81 MG: 81 TABLET, COATED ORAL at 08:46

## 2024-06-13 RX ADMIN — MELOXICAM 15 MG: 15 TABLET ORAL at 08:46

## 2024-06-13 RX ADMIN — OXYCODONE AND ACETAMINOPHEN 1 TABLET: 5; 325 TABLET ORAL at 04:48

## 2024-06-13 NOTE — PLAN OF CARE
Goal Outcome Evaluation:              Outcome Evaluation: 79 y.o patient POD3 R total hip conversion with Dr. Roldan. A&OX4. VSS. RA. ADELITA dressing dry and intact with dried drainage noted. Neuro checks WDL. Pain managed via PRN medications. Ambulating assist X1 with walker and gait belt. Patient able to ambulate in hallway this shift. WBAT. Repeat H&H lab showed increase, 8.3 Hg and 25 Hct. Plan to d/c home with family today.

## 2024-06-13 NOTE — DISCHARGE SUMMARY
Orthopaedic Discharge Summary  Dr. VIRAL Velarde” Stanton II  [phone]    NAME: Chacho Martinez PCP: Provider, No Known   :  MRN: [id number]  [id number] LOS:  ADMIT: 1 days  6/10/2024   AGE/SEX: 79 y.o. male DC:  today             Admitting Diagnosis: Hip joint replacement status [Z96.649]  S/P total hip arthroplasty [Z96.649]  Osteoarthritis of hip [M16.9]    Surgery Performed: [place] ARTHRP ACETBLR/PROX FEM PROSTC AGRFT/ALGRFT [09401] (RIGHT TOTAL HIP ARTHROPLASTY CONVERSION)    Discharge Medications:         Discharge Medications        New Medications        Instructions Start Date   Aspirin Low Dose 81 MG EC tablet  Generic drug: aspirin   81 mg, Oral, Every 12 Hours      ondansetron 4 MG tablet  Commonly known as: Zofran   4 mg, Oral, Every 6 Hours PRN      oxyCODONE-acetaminophen 5-325 MG per tablet  Commonly known as: PERCOCET   1 tablet, Oral, Every 4 Hours PRN             Continue These Medications        Instructions Start Date   Calcium Carbonate-Vitamin D 250-3.125 MG-MCG tablet   1 tablet, Oral, Daily      carvedilol 6.25 MG tablet  Commonly known as: COREG   6.25 mg, Oral, 2 Times Daily      Centrum Silver 50+Men tablet tablet   1 tablet, Oral, Daily               Vitals:     Vitals:    24 1533 24 1759 24 2223 24 0517   BP: 161/75 158/66 153/67 169/73   BP Location:  Right arm Right arm Right arm   Patient Position:  Lying Lying Lying   Pulse: 71 76 79 69   Resp: 16 16 16 16   Temp: 98.2 °F (36.8 °C) 97.5 °F (36.4 °C) 98.4 °F (36.9 °C) 97.9 °F (36.6 °C)   TempSrc: Oral Oral Oral Oral   SpO2: 97% 96% 96% 96%       Labs:      Admission on 06/10/2024   Component Date Value Ref Range Status    Color, UA 2024 Yellow  Yellow, Straw Final    Appearance, UA 2024 Clear  Clear Final    pH, UA 2024 7.0  5.0 - 8.0 Final    Specific Gravity, UA 2024 1.012  1.005 - 1.030 Final    Glucose, UA 2024 Negative  Negative Final    Ketones, UA 2024 Negative   Negative Final    Bilirubin, UA 06/05/2024 Negative  Negative Final    Blood, UA 06/05/2024 Negative  Negative Final    Protein, UA 06/05/2024 Negative  Negative Final    Leuk Esterase, UA 06/05/2024 Negative  Negative Final    Nitrite, UA 06/05/2024 Negative  Negative Final    Urobilinogen, UA 06/05/2024 0.2 E.U./dL  0.2 - 1.0 E.U./dL Final    RBC, UA 06/05/2024 0-2  None Seen, 0-2 /HPF Final    WBC, UA 06/05/2024 0-2  None Seen, 0-2 /HPF Final    Bacteria, UA 06/05/2024 None Seen  None Seen /HPF Final    Squamous Epithelial Cells, UA 06/05/2024 None Seen  None Seen, 0-2 /HPF Final    Hyaline Casts, UA 06/05/2024 None Seen  None Seen /LPF Final    Methodology 06/05/2024 Automated Microscopy   Final    proBNP 06/05/2024 420.0  0.0 - 1,800.0 pg/mL Final    Total Cholesterol 06/05/2024 146  0 - 200 mg/dL Final    Triglycerides 06/05/2024 60  0 - 150 mg/dL Final    HDL Cholesterol 06/05/2024 79 (H)  40 - 60 mg/dL Final    LDL Cholesterol  06/05/2024 55  0 - 100 mg/dL Final    VLDL Cholesterol 06/05/2024 12  5 - 40 mg/dL Final    LDL/HDL Ratio 06/05/2024 0.70   Final    Glucose 06/10/2024 147 (H)  65 - 99 mg/dL Final    BUN 06/10/2024 12  8 - 23 mg/dL Final    Creatinine 06/10/2024 0.71 (L)  0.76 - 1.27 mg/dL Final    Sodium 06/10/2024 139  136 - 145 mmol/L Final    Potassium 06/10/2024 3.7  3.5 - 5.2 mmol/L Final    Chloride 06/10/2024 105  98 - 107 mmol/L Final    CO2 06/10/2024 21.7 (L)  22.0 - 29.0 mmol/L Final    Calcium 06/10/2024 8.0 (L)  8.6 - 10.5 mg/dL Final    BUN/Creatinine Ratio 06/10/2024 16.9  7.0 - 25.0 Final    Anion Gap 06/10/2024 12.3  5.0 - 15.0 mmol/L Final    eGFR 06/10/2024 93.9  >60.0 mL/min/1.73 Final    Hemoglobin 06/10/2024 8.9 (L)  13.0 - 17.7 g/dL Final    Hematocrit 06/10/2024 25.8 (L)  37.5 - 51.0 % Final    Hemoglobin 06/11/2024 7.7 (L)  13.0 - 17.7 g/dL Final    Hematocrit 06/11/2024 22.3 (L)  37.5 - 51.0 % Final    Hemoglobin 06/12/2024 6.1 (C)  13.0 - 17.7 g/dL Final    Hematocrit  06/12/2024 18.1 (C)  37.5 - 51.0 % Final    Product Code 06/13/2024 [id number]   Final    Unit Number 06/13/2024 [id number]   Final    UNIT  ABO 06/13/2024 A   Final    UNIT  RH 06/13/2024 NEG   Final    Crossmatch Interpretation 06/13/2024 Compatible   Final    Dispense Status 06/13/2024 PT   Final    Blood Expiration Date 06/13/2024 202406192359   Final    Blood Type Barcode 06/13/2024 0600   Final    Product Code 06/13/2024 [id number]   Final    Unit Number 06/13/2024 [id number]   Final    UNIT  ABO 06/13/2024 A   Final    UNIT  RH 06/13/2024 NEG   Final    Crossmatch Interpretation 06/13/2024 Compatible   Final    Dispense Status 06/13/2024 PT   Final    Blood Expiration Date 06/13/2024 202406192359   Final    Blood Type Barcode 06/13/2024 0600   Final    ABO Type 06/12/2024 A   Final    RH type 06/12/2024 Positive   Final    Antibody Screen 06/12/2024 Negative   Final    T&S Expiration Date 06/12/2024 6/15/2024 11:59:59 PM   Final    Hemoglobin 06/12/2024 8.3 (L)  13.0 - 17.7 g/dL Final    Hematocrit 06/12/2024 25.0 (L)  37.5 - 51.0 % Final        No results found.    Hospital Course:   79 y.o. male was admitted to [facility] to services of Mio Roldan II, MD with Hip joint replacement status [Z96.649]  S/P total hip arthroplasty [Z96.649]  Osteoarthritis of hip [M16.9] on 6/10/2024 and underwent [place] ARTHRP ACETBLR/PROX FEM PROSTC AGRFT/ALGRFT [54090] (RIGHT TOTAL HIP ARTHROPLASTY CONVERSION). Post-operatively the patient transferred to the floor where the patient underwent mobilization therapy. Opioids were titrated to achieve appropriate pain management to allow for participation in mobilization exercises. Vital signs and laboratory values are now within safe parameters for discharge. The dressings and/or incision is intact without signs or symptoms of active infection. Operative extremity neurovascular status remains intact as compared to the preoperative exam. Appropriate education  "re: incision care, activity levels, medications, and follow up visits was completed and all questions were answered. The patient is now deemed stable for discharge.  He did have problems with postoperative anemia.  He was transfused and responded well to this.    HOME: The patient progressed well with physical therapy. There were cleared for discharge to home. The patietn was sent home in good condition}.       R \"Bakari\" Jamar TAYLOR MD  Orthopaedic Surgery  [facility]  [phone]                                               "

## 2024-06-13 NOTE — PLAN OF CARE
Goal Outcome Evaluation:  Plan of Care Reviewed With: patient           Outcome Evaluation: Patient seen for PT session this AM. Patient is POD3 R NIURKA revision anterior approach. Patient with multiple questions regarding hip precautions. All questions addressed. Patient completed all bed mobility mod(I). Patient ambulated 2 laps around the unit with rwx and SBA. Gait slow and mildly antalgic. Cues for upright posture. Improved stride length, step through gait pattern, and knee extension as distance increased. Patient sittng at EOB at end of session. Patient planning to d/c home today to his sons house with PT follow up. Acute PT will sign off.      Anticipated Discharge Disposition (PT): home with home health, home with assist

## 2024-06-13 NOTE — THERAPY TREATMENT NOTE
"Patient Name: Chacho Martinez  : 1945    MRN: [id number]                              Today's Date: 2024       Admit Date: 6/10/2024    Visit Dx:     ICD-10-CM ICD-9-CM   1. SOB (shortness of breath)  R06.02 786.05   2. Essential hypertension  I10 401.9     Patient Active Problem List   Diagnosis    Hip joint replacement status    S/P total hip arthroplasty    Osteoarthritis of hip     Past Medical History:   Diagnosis Date    Arthritis     Atherosclerosis     Avascular necrosis     RIGHT HIP    GERD (gastroesophageal reflux disease)     Heart valve disorder     \"FLOPPY\"    History of gastric ulcer     AGE 20'S    Kaw (hard of hearing)     Hypertension     Right hip pain     Scab     LEFT SHIN     Past Surgical History:   Procedure Laterality Date    FEMUR FRACTURE SURGERY Right     3 SCREWS    FEMUR FRACTURE SURGERY Left     \"BALL AND SHAFT\"    HERNIA REPAIR      WITH MESH, INGUINAL AND UMBILICAL    TOTAL HIP ARTHROPLASTY REVISION Right 6/10/2024    Procedure: RIGHT TOTAL HIP ARTHROPLASTY CONVERSION;  Surgeon: Mio Roldan II, MD;  Location: [facility] OR [facility];  Service: Orthopedics;  Laterality: Right;      General Information       Row Name 24 1009          Physical Therapy Time and Intention    Document Type therapy note (daily note)  -     Mode of Treatment individual therapy;physical therapy  -       Row Name 24 1009          General Information    Patient Profile Reviewed yes  -     Existing Precautions/Restrictions fall;hip, anterior;right  -       Row Name 24 1009          Cognition    Orientation Status (Cognition) oriented x 4  -       Row Name 24 1009          Safety Issues, Functional Mobility    Impairments Affecting Function (Mobility) endurance/activity tolerance;strength;pain;range of motion (ROM)  -               User Key  (r) = Recorded By, (t) = Taken By, (c) = Cosigned By      Initials Name Provider Type     Osito" MARIE Rivero Physical Therapist                   Mobility       Row Name 06/13/24 1009          Bed Mobility    Bed Mobility supine-sit  -     Supine-Sit [place] (Bed Mobility) modified independence  -     Assistive Device (Bed Mobility) bed rails;head of bed elevated  -       Row Name 06/13/24 1009          Sit-Stand Transfer    Sit-Stand [place] (Transfers) standby assist  -     Assistive Device (Sit-Stand Transfers) walker, front-wheeled  -       Row Name 06/13/24 1009          Gait/Stairs (Locomotion)    [place] Level (Gait) standby assist;verbal cues  -     Assistive Device (Gait) walker, front-wheeled  -     Distance in Feet (Gait) 900  -     Deviations/Abnormal Patterns (Gait) gait speed decreased;stride length decreased;antalgic  -     Bilateral Gait Deviations forward flexed posture  -     Comment, (Gait/Stairs) Gait slow and mildly antalgic. Cues for upright posture. Improved stride length, step through gait pattern, and knee extension as distance increased.  -               User Key  (r) = Recorded By, (t) = Taken By, (c) = Cosigned By      Initials Name Provider Type     Josefa Mcneill PT Physical Therapist                   Obj/Interventions       Row Name 06/13/24 1010          Balance    Balance Assessment sitting static balance;sitting dynamic balance;sit to stand dynamic balance;standing static balance;standing dynamic balance  -     Static Sitting Balance independent  -     Dynamic Sitting Balance modified independence  -     Position, Sitting Balance sitting edge of bed  -     Sit to Stand Dynamic Balance standby assist  -     Static Standing Balance supervision  -     Dynamic Standing Balance standby assist  -     Position/Device Used, Standing Balance supported;walker, front-wheeled  -     Balance Interventions standing;sitting;sit to stand;supported;static;dynamic  -               User Key  (r) = Recorded By, (t) = Taken By, (c) =  Cosigned By      Initials Name Provider Type     Josefa Mcneill PT Physical Therapist                   Goals/Plan    No documentation.                  Clinical Impression       [facility] Name 06/13/24 1011          Pain    Pretreatment Pain Rating 3/10  -     Posttreatment Pain Rating 4/10  -SM     Pain Location - Side/Orientation Right  -     Pain Location - hip  -     Pain Intervention(s) Rest;Repositioned;Ambulation/increased activity  -[facility] Name 06/13/24 1011          Plan of Care Review    Plan of Care Reviewed With patient  -     Outcome Evaluation Patient seen for PT session this AM. Patient is POD3 R NIURKA revision anterior approach. Patient with multiple questions regarding hip precautions. All questions addressed. Patient completed all bed mobility mod(I). Patient ambulated 2 laps around the unit with rwx and SBA. Gait slow and mildly antalgic. Cues for upright posture. Improved stride length, step through gait pattern, and knee extension as distance increased. Patient sittng at EOB at end of session. Patient planning to d/c home today to his sons house with PT follow up. Acute PT will sign off.  -SM       Row Name 06/13/24 1011          Vital Signs    O2 Delivery Pre Treatment room air  -SM     O2 Delivery Intra Treatment room air  -SM     O2 Delivery Post Treatment room air  -SM     Pre Patient Position Supine  -     Intra Patient Position Standing  -SM     Post Patient Position Sitting  -SM       Row Name 06/13/24 1011          Positioning and Restraints    Pre-Treatment Position in bed  -SM     Post Treatment Position bed  -SM     In Bed sitting EOB;encouraged to call for assist;call light within reach;notified [facility]  -               User Key  (r) = Recorded By, (t) = Taken By, (c) = Cosigned By      Initials Name Provider Type     Josefa Mcneill PT Physical Therapist                   Outcome Measures       Row Name 06/13/24 1013 06/13/24 0846       How much help from another  person do you currently need...    Turning from your back to your side while in flat bed without using bedrails? 4  -SM 3  -KM    Moving from lying on back to sitting on the side of a flat bed without bedrails? 4  -SM 3  -KM    Moving to and from a bed to a chair (including a wheelchair)? 4  -SM 3  -KM    Standing up from a chair using your arms (e.g., wheelchair, bedside chair)? 4  -SM 3  -KM    Climbing 3-5 steps with a railing? 3  -SM 3  -KM    To walk in hospital room? 4  -SM 3  -KM    AM-PAC 6 Clicks Score (PT) 23  -SM 18  -KM    Highest Level of Mobility Goal 7 --> Walk 25 feet or more  -SM 6 --> Walk 10 steps or more  -KM              User Key  (r) = Recorded By, (t) = Taken By, (c) = Cosigned By      Initials Name Provider Type     Helen Resendez, RN Registered Nurse     Josefa Mcneill, PT Physical Therapist                                 Physical Therapy Education       Title: PT OT SLP Therapies (Done)       Topic: Physical Therapy (Done)       Point: Mobility training (Done)       Learning Progress Summary             Patient Acceptance, E, VU by  at 6/13/2024 1013    Acceptance, E, VU by  at 6/11/2024 1308                         Point: Home exercise program (Done)       Learning Progress Summary             Patient Acceptance, E, VU by  at 6/13/2024 1013    Acceptance, E, VU by MOMO at 6/11/2024 1308                         Point: Body mechanics (Done)       Learning Progress Summary             Patient Acceptance, E, VU by  at 6/13/2024 1013    Acceptance, E, VU by  at 6/11/2024 1308                         Point: Precautions (Done)       Learning Progress Summary             Patient Acceptance, E, VU by  at 6/13/2024 1013    Acceptance, E, VU by  at 6/11/2024 1308                                         User Key       Initials Effective Dates Name Provider Type Discipline     10/25/19 -  Gisselle Jackson, PT Physical Therapist PT     05/02/22 -  Josefa Mcneill, MARIE Physical  Therapist PT                  PT Recommendation and Plan     Plan of Care Reviewed With: patient  Outcome Evaluation: Patient seen for PT session this AM. Patient is POD3 R NIURKA revision anterior approach. Patient with multiple questions regarding hip precautions. All questions addressed. Patient completed all bed mobility mod(I). Patient ambulated 2 laps around the unit with rwx and SBA. Gait slow and mildly antalgic. Cues for upright posture. Improved stride length, step through gait pattern, and knee extension as distance increased. Patient sittng at EOB at end of session. Patient planning to d/c home today to his sons house with PT follow up. Acute PT will sign off.     Time Calculation:         PT Charges       Row Name 06/13/24 1013             Time Calculation    Start Time 0912  -      Stop Time 0935  -      Time Calculation (min) 23 min  -SM      PT Received On 06/13/24  -         Time Calculation- PT    Total Timed Code Minutes- PT 23 minute(s)  -SM         Timed Charges    99320 - PT Therapeutic Exercise Minutes --  -      79384 - Gait Training Minutes  8  -SM      86643 - PT Therapeutic Activity Minutes 15  -SM         Total Minutes    Timed Charges Total Minutes 23  -SM       Total Minutes 23  -SM                User Key  (r) = Recorded By, (t) = Taken By, (c) = Cosigned By      Initials Name Provider Type     Josefa Mcneill, MARIE Physical Therapist                  Therapy Charges for Today       Code Description Service Date Service Provider Modifiers Qty    51686568559 HC GAIT TRAINING EA 15 MIN 6/13/2024 Josefa Mcneill, PT GP 1    [id number]  PT THERAPEUTIC ACT EA 15 MIN 6/13/2024 Josefa Mcneill, PT GP 1            PT G-Codes  Outcome Measure Options: AM-PAC 6 Clicks Basic Mobility (PT)  AM-PAC 6 Clicks Score (PT): 23  PT Discharge Summary  Anticipated Discharge Disposition (PT): home with home health, home with assist    Josefa Mcneill PT  6/13/2024

## 2024-06-16 NOTE — CASE MANAGEMENT/SOCIAL WORK
Case Management Discharge Note      Final Note: dc'd to home         Selected Continued Care - Discharged on 6/13/2024 Admission date: 6/10/2024 - Discharge disposition: Home or Self Care      Destination    No services have been selected for the patient.                Durable Medical Equipment    No services have been selected for the patient.                Dialysis/Infusion    No services have been selected for the patient.                Home Medical Care Coordination complete.      Service Provider Selected Services Address Phone Fax Patient Preferred    [facility] [address] [place] [phone] --              Therapy    No services have been selected for the patient.                Community Resources    No services have been selected for the patient.                Community & DME    No services have been selected for the patient.                    Transportation Services  Private: Car    Final Discharge Disposition Code: 01 - home or self-care

## 2024-06-17 ENCOUNTER — TELEPHONE (OUTPATIENT)
Dept: ORTHOPEDIC SURGERY | Facility: HOSPITAL | Age: 79
End: 2024-06-17
Payer: MEDICARE

## 2024-07-03 RX ORDER — OXYCODONE HYDROCHLORIDE AND ACETAMINOPHEN 5; 325 MG/1; MG/1
1 TABLET ORAL EVERY 4 HOURS PRN
Qty: 30 TABLET | Refills: 0 | OUTPATIENT
Start: 2024-07-03

## (undated) DEVICE — PATIENT RETURN ELECTRODE, SINGLE-USE, CONTACT QUALITY MONITORING, ADULT, WITH 9FT CORD, FOR PATIENTS WEIGING OVER 33LBS. (15KG): Brand: MEGADYNE

## (undated) DEVICE — S/M FLEXIBLE ALEXIS ORTHOPAEDIC PROTECTOR: Brand: ALEXIS® ORTHOPAEDIC PROTECTOR

## (undated) DEVICE — GLV SURG PREMIERPRO ORTHO LTX PF SZ8.5 BRN

## (undated) DEVICE — SUT ETHIB 2 CV V37 MS/4 30IN MX69G

## (undated) DEVICE — STPLR SKIN VISISTAT WD 35CT

## (undated) DEVICE — CEMENT MIXING SYSTEM WITH FEMORAL BREAKWAY NOZZLE: Brand: REVOLUTION

## (undated) DEVICE — 450 ML BOTTLE OF 0.05% CHLORHEXIDINE GLUCONATE IN 99.95% STERILE WATER FOR IRRIGATION, USP AND APPLICATOR.: Brand: IRRISEPT ANTIMICROBIAL WOUND LAVAGE

## (undated) DEVICE — COAXIAL FEMORAL CANAL TIP

## (undated) DEVICE — SUT VIC 2/0 CT1 CR8 18IN J839D

## (undated) DEVICE — SOL NACL 0.9PCT 100ML SGL

## (undated) DEVICE — MAT FLR ABSORBENT LG 4FT 10 2.5FT

## (undated) DEVICE — 3M™ IOBAN™ 2 ANTIMICROBIAL INCISE DRAPE 6640EZ: Brand: IOBAN™ 2

## (undated) DEVICE — PK ANT HIP 40

## (undated) DEVICE — GLV SURG SIGNATURE ESSENTIAL PF LTX SZ8.5

## (undated) DEVICE — PREP IM ENCHANCED TOTAL HIP BONE                                    PREPARATION KIT: Brand: PREP-IM

## (undated) DEVICE — APPL CHLORAPREP HI/LITE 26ML ORNG

## (undated) DEVICE — NEEDLE, QUINCKE, 20GX3.5": Brand: MEDLINE

## (undated) DEVICE — RECIPROCATING BLADE HEAVY DUTY LONG, OFFSET  (77.6 X 0.77 X 11.2MM)

## (undated) DEVICE — PICO 7 10CM X 30CM: Brand: PICO™ 7

## (undated) DEVICE — DRAPE,REIN 53X77,STERILE: Brand: MEDLINE

## (undated) DEVICE — PENCL SMOKE/EVAC MEGADYNE TELESCP 10FT

## (undated) DEVICE — DRSNG BURN ACTICOAT FLEX 7 1X24IN

## (undated) DEVICE — SOL ISO/ALC 70PCT 4OZ

## (undated) DEVICE — TRAP FLD MINIVAC MEGADYNE 100ML